# Patient Record
Sex: MALE | Race: BLACK OR AFRICAN AMERICAN
[De-identification: names, ages, dates, MRNs, and addresses within clinical notes are randomized per-mention and may not be internally consistent; named-entity substitution may affect disease eponyms.]

---

## 2023-02-15 ENCOUNTER — HOSPITAL ENCOUNTER (EMERGENCY)
Dept: HOSPITAL 97 - ER | Age: 53
Discharge: HOME | End: 2023-02-15
Payer: COMMERCIAL

## 2023-02-15 VITALS — OXYGEN SATURATION: 97 % | DIASTOLIC BLOOD PRESSURE: 83 MMHG | SYSTOLIC BLOOD PRESSURE: 149 MMHG

## 2023-02-15 VITALS — TEMPERATURE: 98.8 F

## 2023-02-15 DIAGNOSIS — I10: ICD-10-CM

## 2023-02-15 DIAGNOSIS — J44.1: Primary | ICD-10-CM

## 2023-02-15 DIAGNOSIS — F17.210: ICD-10-CM

## 2023-02-15 LAB
ALBUMIN SERPL BCP-MCNC: 4 G/DL (ref 3.4–5)
ALBUMIN SERPL BCP-MCNC: 4.4 G/DL (ref 3.4–5)
ALP SERPL-CCNC: 74 U/L (ref 45–117)
ALP SERPL-CCNC: 79 U/L (ref 45–117)
ALT SERPL W P-5'-P-CCNC: 21 U/L (ref 16–61)
ALT SERPL W P-5'-P-CCNC: 29 U/L (ref 16–61)
AST SERPL W P-5'-P-CCNC: 16 U/L (ref 15–37)
AST SERPL W P-5'-P-CCNC: 32 U/L (ref 15–37)
BUN BLD-MCNC: 11 MG/DL (ref 7–18)
BUN BLD-MCNC: 12 MG/DL (ref 7–18)
GLUCOSE SERPLBLD-MCNC: 116 MG/DL (ref 74–106)
GLUCOSE SERPLBLD-MCNC: 175 MG/DL (ref 74–106)
HCT VFR BLD CALC: 42.8 % (ref 39.6–49)
INR BLD: 1.08
LYMPHOCYTES # SPEC AUTO: 1.5 K/UL (ref 0.7–4.9)
MCV RBC: 90.6 FL (ref 80–100)
NT-PROBNP SERPL-MCNC: 99 PG/ML (ref ?–125)
PMV BLD: 7.6 FL (ref 7.6–11.3)
POTASSIUM SERPL-SCNC: 2.6 MMOL/L (ref 3.5–5.1)
POTASSIUM SERPL-SCNC: 3.3 MMOL/L (ref 3.5–5.1)
RBC # BLD: 4.72 M/UL (ref 4.33–5.43)
TROPONIN I SERPL HS-MCNC: 4 PG/ML (ref ?–58.9)

## 2023-02-15 PROCEDURE — 80053 COMPREHEN METABOLIC PANEL: CPT

## 2023-02-15 PROCEDURE — 71275 CT ANGIOGRAPHY CHEST: CPT

## 2023-02-15 PROCEDURE — 36415 COLL VENOUS BLD VENIPUNCTURE: CPT

## 2023-02-15 PROCEDURE — 80048 BASIC METABOLIC PNL TOTAL CA: CPT

## 2023-02-15 PROCEDURE — 83605 ASSAY OF LACTIC ACID: CPT

## 2023-02-15 PROCEDURE — 84484 ASSAY OF TROPONIN QUANT: CPT

## 2023-02-15 PROCEDURE — 85379 FIBRIN DEGRADATION QUANT: CPT

## 2023-02-15 PROCEDURE — 83880 ASSAY OF NATRIURETIC PEPTIDE: CPT

## 2023-02-15 PROCEDURE — 71046 X-RAY EXAM CHEST 2 VIEWS: CPT

## 2023-02-15 PROCEDURE — 85730 THROMBOPLASTIN TIME PARTIAL: CPT

## 2023-02-15 PROCEDURE — 85025 COMPLETE CBC W/AUTO DIFF WBC: CPT

## 2023-02-15 PROCEDURE — 85610 PROTHROMBIN TIME: CPT

## 2023-02-15 PROCEDURE — 80076 HEPATIC FUNCTION PANEL: CPT

## 2023-02-15 PROCEDURE — 87040 BLOOD CULTURE FOR BACTERIA: CPT

## 2023-02-15 NOTE — RAD REPORT
EXAM DESCRIPTION:  CT - Chest For Pe Angio - 2/15/2023 9:49 am

 

CLINICAL HISTORY:  Congestion. High blood pressure. Shortness of breath.

 

COMPARISON:  Chest radiograph of earlier the same day

 

TECHNIQUE:  Dynamically enhanced axial 3 mm thick images of the chest were obtained during administra
tion of 100 mL Isovue 370 IV contrast. Coronal and oblique reconstruction images were generated and r
eviewed. Exam utilizes a protocol for optimal evaluation of pulmonary arterial tree.

 

All CT scans are performed using dose optimization technique as appropriate and may include automated
 exposure control or mA/KV adjustment according to patient size.

 

FINDINGS:  Pulmonary arteries are normal in caliber. No emboli or other suspicious finding. No acute 
or significant aortic findings.

 

No mass or infiltrate in the lung parenchyma. 3 millimeter left upper lobe nodule, axial image 11/155
, likely benign. No pleural thickening or pleural effusion. No pneumothorax.

 

No abnormal mediastinal or hilar masses or lymphadenopathy seen. No chest wall mass or abnormal axill
iary lymphadenopathy.

 

 

IMPRESSION:  No acute centra pulmonary embolus.

 

No other acute pulmonary process.

## 2023-02-15 NOTE — EDPHYS
Physician Documentation                                                                           

 [facility]                                                                 

Name: Christopher Howard                                                                               

Age: 52 yrs                                                                                       

Sex: Male                                                                                         

: 1970                                                                                   

MRN: [id number]                                                                                   

Arrival Date: 02/15/2023                                                                          

Time: 07:35                                                                                       

Account#: [id number]                                                                            

Bed 15                                                                                            

Private MD:                                                                                       

ED Physician Jared Crystal                                                                       

HPI:                                                                                              

02/15                                                                                             

07:56 This 52 yrs old Black Male presents to ER via Ambulatory with complaints of Breathing   bs3 

      Difficulty, High Blood Pressure.                                                            

07:56 52-year-old history of hypertension, hyperlipidemia, smoking presents with chest        bs3 

      pressure difficulty breathing he has a slight cough he could not sleep last night           

      because of the cough and therefore he came in today he notes fever yesterday symptoms       

      have been going on for several days but got worse yesterday.                                

07:56 He denies any leg swelling the chest discomfort is when he coughs that is not constant  bs3 

      it is not exertional.                                                                       

                                                                                                  

Historical:                                                                                       

- Allergies:                                                                                      

07:59 No Known Allergies;                                                                     ap3 

- Home Meds:                                                                                      

07:59 metoprolol tartrate 100 mg Oral tab 1 tab 2 times per day [Active]; amlodipine 10 mg    ap3 

      tab 1 tab once daily [Active]; hydrochlorothiazide 25 mg Oral tab 1 tab once daily          

      [Active]; atorvastatin 20 mg oral tab 1 tab once daily [Active]; losartan 100 mg oral       

      tab 1 tab once daily [Active];                                                              

- PMHx:                                                                                           

07:59 Hypertensive disorder; Hypercholesterolemia;                                            ap3 

                                                                                                  

- Immunization history:: Client reports receiving the 2nd dose of the Covid vaccine,              

  Flu vaccine is not up to date.                                                                  

- Social history:: Smoking status: Patient reports the use of cigarette tobacco                   

  products, smokes one pack cigarettes per day. Patient uses alcohol, claims drinking             

  about a 6 pack/day.                                                                             

                                                                                                  

                                                                                                  

ROS:                                                                                              

07:56 Constitutional: Positive for fever                                                      bs3 

07:56 All other systems are negative.                                                             

                                                                                                  

Exam:                                                                                             

07:56 Constitutional:  This is a well developed, well nourished patient who is awake, alert,  bs3 

      and in no acute distress. Head/Face:  Normocephalic, atraumatic. Eyes:  Pupils equal        

      round and reactive to light, extra-ocular motions intact.  Lids and lashes normal.          

      ENT:  mmm, no posterior phyarngeal erythema Neck:  Trachea midline, no thyromegaly, no      

      neck stiffness Chest/axilla:  Normal chest wall appearance and motion.  Nontender with      

      no deformity.  No lesions are appreciated. Cardiovascular:  Regular rate and rhythm         

      with a normal S1 and S2.  symmetric pulses in upper extremities Respiratory:                

      Expiratory wheezing, no increased work of breathing no crackles                             

                                                                                                  

Vital Signs:                                                                                      

07:57  / 105; Pulse 139; Resp 19; Temp 98.8; Pulse Ox 96% ; Weight 89.81 kg; Height 5   ap3 

      ft. 8 in. (172.72 cm); Pain 0/10;                                                           

08:31  / 102; Pulse 144; Resp 26; Pulse Ox 100% on Nebulizer Mask;                      db  

09:00  / 95; Pulse 120; Resp 20; Pulse Ox 95% ;                                         db  

09:30  / 94; Pulse 112; Resp 18; Pulse Ox 99% on R/A;                                   db  

10:55  / 82; Pulse 111; Resp 20; Pulse Ox 98% on R/A;                                   db  

11:30  / 89; Pulse 143; Resp 18; Pulse Ox 100% on R/A;                                  db  

12:00  / 81; Pulse 128; Resp 18; Pulse Ox 100% on R/A;                                  db  

13:00  / 83; Pulse 105; Resp 18; Pulse Ox 97% ;                                         db  

07:57 Body Mass Index 30.11 (89.81 kg, 172.72 cm)                                             ap3 

                                                                                                  

MDM:                                                                                              

07:44 Patient medically screened.                                                             bs3 

07:56 Differential diagnosis: Possible ACS but less likely possible heart failure although    bs3 

      less likely patient is a heavy smoker no history of COPD but given the wheezing will        

      try DuoNeb patient may have an acute upper respiratory infection causing reactive           

      airway disease we will do serial exams will rule out pneumonia.                             

10:20 ED course: D-dimer was elevated therefore a CTA was done which was negative patient had bs3 

      improvement of his heart rate after taking his blood pressure medications and the           

      DuoNeb's work-up was nondiagnostic he likely has undiagnosed COPD.                          

10:38 ED course: Patient had significant improvement with the DuoNeb with how he felt         bs3 

      therefore likely undiagnosed COPD counseled at length.                                      

10:50 Data reviewed: vital signs, nurses notes. ED course: Telemetry reviewed normal sinus    bs3 

      108 at 10:50 AM.                                                                            

11:15 ED course: Advised him to follow-up regarding his pulmonary nodule given his smoking    bs3 

      history.                                                                                    

11:24 ED course: EKG performed at 844 interpreted by myself at 848, sinus tachycardia at 146  bs3 

      no ST elevations or depressions QTc 467.                                                    

13:25 ED course: Patient feeling much better on reassessment x-rays read by myself no acute   bs3 

      pulmonary edema heart rate improved discussed with patient who wanted to go home will       

      discharge home return precautions given.                                                    

                                                                                                  

02/15                                                                                             

07:55 Order name: CBC with Diff                                                               bs3 

02/15                                                                                             

07:55 Order name: BMP                                                                         bs3 

02/15                                                                                             

07:55 Order name: Troponin High Sensitivity                                                   bs3 

02/15                                                                                             

07:55 Order name: BNP                                                                         bs3 

02/15                                                                                             

08:25 Order name: D-Dimer                                                                     bs3 

02/15                                                                                             

08:59 Order name: CBC with Automated Diff; Complete Time: 09:10                               EDMS

02/15                                                                                             

09:06 Order name: D-Dimer; Complete Time: 09:10                                               EDMS

02/15                                                                                             

09:11 Order name: Blood Culture Adult (2)                                                     bs3 

02/15                                                                                             

09:11 Order name: CMP                                                                         bs3 

02/15                                                                                             

09:11 Order name: Lactate w/ 2H reflex if indic.                                              bs3 

02/15                                                                                             

09:11 Order name: Protime (+inr)                                                              bs3 

02/15                                                                                             

09:11 Order name: Ptt, Activated                                                              bs3 

02/15                                                                                             

09:20 Order name: Basic Metabolic Panel; Complete Time: 10:19                                 EDMS

02/15                                                                                             

09:20 Order name: Troponin High Sensitivity; Complete Time: 10:19                             EDMS

02/15                                                                                             

07:55 Order name: XRAY Chest Pa And Lat (2 Views)                                             bs3 

02/15                                                                                             

08:41 Order name: RAD; Complete Time: 09:10                                                   EDMS

02/15                                                                                             

09:11 Order name: CT Chest For PE Angio                                                       bs3 

02/15                                                                                             

09:20 Order name: NT PRO-BNP; Complete Time: 10:19                                            EDMS

02/15                                                                                             

10:19 Order name: CT; Complete Time: 10:19                                                    EDMS

02/15                                                                                             

10:20 Order name: Protime (+INR); Complete Time: 11:15                                        EDMS

02/15                                                                                             

10:20 Order name: PTT, Activated Partial Thromb; Complete Time: 11:15                         EDMS

02/15                                                                                             

10:21 Order name: Lactate w/ 2H reflex if indic.; Complete Time: 11:15                        EDMS

02/15                                                                                             

10:34 Order name: LFT's                                                                       bs3 

02/15                                                                                             

11:25 Order name: Liver (Hepatic) Function; Complete Time: 12:18                              EDMS

02/15                                                                                             

13:20 Order name: Comprehensive Metabolic Panel                                               EDMS

02/15                                                                                             

13:39 Order name: Lactate Sepsis 2 HR Follow-up                                               EDMS

02/15                                                                                             

07:55 Order name: EKG - Nurse/Tech; Complete Time: 09:07                                      bs3 

                                                                                                  

Administered Medications:                                                                         

08:25 Drug: DuoNeb (albuterol 2.5 mg, ipratropium 0.5 mg) (3:1) (2.5 mg - 0.5 mg) 3 ml Route: db  

      Nebulizer;                                                                                  

09:18 Follow up: Response: No adverse reaction                                                db  

09:05 Drug: NS 0.9% 500 ml Route: IV; Rate: bolus; Site: right antecubital;                   db  

13:48 Follow up: Response: No adverse reaction; IV Status: Completed infusion; IV Intake:     db  

      500ml                                                                                       

10:33 Drug: Rocephin (cefTRIAXone) 1 grams Route: IV; Rate: bolus; Site: right antecubital;   db  

11:00 Follow up: Response: No adverse reaction; IV Status: Completed infusion; IV Intake: 50mldb  

10:57 Drug: MethylPrednisoLONE 40 mg Route: IVP; Site: right antecubital;                     db  

11:32 Follow up: Response: No adverse reaction                                                db  

10:58 Drug: DuoNeb (albuterol 2.5 mg, ipratropium 0.5 mg) (3:1) (2.5 mg - 0.5 mg) 3 ml Route: db  

      Nebulizer;                                                                                  

11:32 Follow up: Response: No adverse reaction                                                db  

11:31 Drug: DuoNeb (albuterol 2.5 mg, ipratropium 0.5 mg) (3:1) (2.5 mg - 0.5 mg) 3 ml Route: db  

      Nebulizer;                                                                                  

12:32 Follow up: Response: No adverse reaction                                                db  

12:45 Drug: NS 0.9% 500 ml Route: IV; Rate: bolus; Site: right antecubital;                   db  

13:48 Follow up: Response: No adverse reaction; IV Status: Completed infusion; IV Intake:     db  

      500ml                                                                                       

                                                                                                  

                                                                                                  

Disposition Summary:                                                                              

02/15/23 13:26                                                                                    

Discharge Ordered                                                                                 

      Location: Home                                                                          bs3 

      Problem: new                                                                            bs3 

      Symptoms: are resolved                                                                  bs3 

      Condition: Stable                                                                       bs3 

      Diagnosis                                                                                   

        - COPD/ Chronic obstructive pulmonary disease with (acute) exacerbation               bs3 

      Followup:                                                                               bs3 

        - With: Private Physician                                                                  

        - When: 2 - 3 days                                                                         

        - Reason: Re-evaluation by your physician                                                  

      Discharge Instructions:                                                                     

        - Discharge Summary Sheet                                                             bs3 

        - Chronic Bronchitis, Adult                                                           bs3 

        - Form - COPD Action Plan                                                             bs3 

        - COPD and Physical Activity                                                          bs3 

      Forms:                                                                                      

        - Medication Reconciliation Form                                                      bs3 

        - Thank You Letter                                                                    bs3 

        - Antibiotic Education                                                                bs3 

        - Prescription Opioid Use                                                             bs3 

      Prescriptions:                                                                              

        - Ventolin HFA 90 mcg/actuation Inhalation HFA aerosol inhaler                             

            - inhale 2 puff by INHALATION route every 4-6 hours; 1 puff; Refills: 0, Product  bs3 

      Selection Permitted                                                                         

        - azithromycin 500 mg Oral tablet                                                          

            - take 1 tablet by ORAL route once daily for 3 days; 3 tablet; Refills: 0,        bs3 

      Product Selection Permitted                                                                 

        - Prednisone 20 mg Oral Tablet                                                             

            - take 3 tablets by ORAL route once daily for 5 days; 15 tablet; Refills: 0,      bs3 

      Product Selection Permitted                                                                 

Signatures:                                                                                       

Dispatcher [employer]                           Adamaris Frederick RN                    RN   ap3                                                  

Jared Crystal MD MD   bs3                                                  

Siobhan Mijares RN                    RN   db                                                   

                                                                                                  

**************************************************************************************************

## 2023-02-15 NOTE — XMS REPORT
Continuity of Care Document

                          Created on:February 15, 2023



Patient:SHELLEY MACKAY

Sex:Male

:1970

External Reference #:[id number]





Demographics







                          Address                   [address]



                                                    [place] [address]

 

                          Home Phone                [phone]

 

                          Work Phone                [phone]

 

                          Mobile Phone              [phone] )

 

                          Email Address             DECLINED

 

                          Preferred Language        Unknown

 

                          Marital Status            Unknown

 

                          Mormonism Affiliation     Unknown

 

                          Race                      Unknown

 

                          Additional Race(s)        Unavailable



                                                    Black or 

 

                          Ethnic Group              Unknown









Author







                          Organization              [facility]

t

 

                          Address                   [address]



                                                    [place] [address]

 

                          Phone                     [phone]









Care Team Providers







                    Name                Role                Phone

 

                    Robbin Prince     Primary Care Physician [phone]









Problems

This patient has no known problems.



Allergies, Adverse Reactions, Alerts







       Allergy Allergy Status Severity Reaction(s) Onset  Inactive Treating Comm

ents 

Source



       Name   Type                        Date   Date   Clinician        

 

       [facility] - [facility] Active               -                      



       Intraven ty to                       5-18                        



       ous    adverse                      00:00:                      



              reaction                      00                          



              to drug                                                  

 

       Lisinopr Propensi Active               -0                      



       il     ty to                       5-21                        



              adverse                      00:00:                      



              reaction                      00                          



              to drug                                                  







Medications







       Ordered Filled Start  Stop   Current Ordering Indication Dosage Frequency

 Signature

                    Comments            Components          Source



     Medication Medication Date Date Medication? Clinician                (SIG) 

          



     Name Name                                                   

 

     amlodipine      -0      No             1mg                      



     10 mg      5-18                                              



     tablet      00:00:                                              



               00                                                

 

     hydrochloro      2-0      No             1mg                      



     thiazide 25      5-18                                              



     mg tablet      00:00:                                              



               00                                                

 

     losartan      2-0      No             1mg                      



     100 mg      5-18                                              



     tablet      00:00:                                              



               00                                                

 

     metoprolol      2-0      No             1mg                      



     succinate      5-18                                              



      mg      00:00:                                              



     tablet,exte      00                                                



     nded                                                        



     release 24                                                        



     hr                                                          

 

     atorvastati      2-0      No             1mg                      



     n 20 mg      5-18                                              



     tablet      00:00:                                              



               00                                                

 

     atorvastati      2-0      No             1mg                      



     n 20 mg      2-01                                              



     tablet      00:00:                                              



               00                                                

 

     hydrochloro      2022-0      No             1mg                      



     thiazide 25      2-01                                              



     mg tablet      00:00:                                              



               00                                                

 

     amlodipine      2022-0      No             1mg                      



     10 mg      2-01                                              



     tablet      00:00:                                              



               00                                                

 

     losartan      2022-0      No             1mg                      



     100 mg      2-01                                              



     tablet      00:00:                                              



               00                                                

 

     metoprolol      2022-0      No             1mg                      



     succinate      2-01                                              



      mg      00:00:                                              



     tablet,exte      00                                                



     nded                                                        



     release 24                                                        



     hr                                                          

 

     losartan      2022-0      No             1mg                      



     100 mg      1-11                                              



     tablet      00:00:                                              



               00                                                

 

     hydrochloro      2022-0      No             1mg                      



     thiazide 25      1-11                                              



     mg tablet      00:00:                                              



               00                                                

 

     amlodipine      2022-0      No             1mg                      



     10 mg      1-11                                              



     tablet      00:00:                                              



               00                                                

 

     metoprolol      2022-0      No             1mg                      



     succinate      1-11                                              



      mg      00:00:                                              



     tablet,exte      00                                                



     nded                                                        



     release 24                                                        



     hr                                                          

 

     atorvastati      2022-0      No             1mg                      



     n 20 mg      1-11                                              



     tablet      00:00:                                              



               00                                                

 

     metoprolol      2021-1      No             1mg                      



     succinate      2-13                                              



      mg      00:00:                                              



     tablet,exte      00                                                



     nded                                                        



     release 24                                                        



     hr                                                          

 

     atorvastati      1-1      No             1mg                      



     n 20 mg      2-13                                              



     tablet      00:00:                                              



               00                                                

 

     hydrochloro      2021-1      No             1mg                      



     thiazide 25      2-13                                              



     mg tablet      00:00:                                              



               00                                                

 

     amlodipine      2021-1      No             1mg                      



     10 mg      2-13                                              



     tablet      00:00:                                              



               00                                                

 

     losartan      2021-1      No             1mg                      



     100 mg      2-13                                              



     tablet      00:00:                                              



               00                                                

 

     hydrochloro      2021-0      No             1mg                      



     thiazide 25      9-16                                              



     mg tablet      00:00:                                              



               00                                                

 

     amlodipine      2021-0      No             1mg                      



     10 mg      9-16                                              



     tablet      00:00:                                              



               00                                                

 

     amlodipine      2021-0      No             1mg                      



     10 mg      6-16                                              



     tablet      00:00:                                              



               00                                                

 

     hydrochloro      2021-0      No             1mg                      



     thiazide 25      6-16                                              



     mg tablet      00:00:                                              



               00                                                

 

     losartan      2021-0      No             1mg                      



     100 mg      6-16                                              



     tablet      00:00:                                              



               00                                                

 

     metoprolol      2021-0      No             1mg                      



     succinate      6-16                                              



      mg      00:00:                                              



     tablet,exte      00                                                



     nded                                                        



     release 24                                                        



     hr                                                          

 

     atorvastati      2021-0      No             1mg                      



     n 20 mg      6-16                                              



     tablet      00:00:                                              



               00                                                

 

     losartan      2021-0      No             1mg                      



     100 mg      3-17                                              



     tablet      00:00:                                              



               00                                                

 

     hydrochloro      2021-0      No             1mg                      



     thiazide 25      3-17                                              



     mg tablet      00:00:                                              



               00                                                

 

     amlodipine      2021-0      No             1mg                      



     10 mg      3-17                                              



     tablet      00:00:                                              



               00                                                

 

     metoprolol      2021-0      No             1mg                      



     succinate      3-17                                              



      mg      00:00:                                              



     tablet,exte      00                                                



     nded                                                        



     release 24                                                        



     hr                                                          

 

     atorvastati      2021-0      No             1mg                      



     n 20 mg      3-17                                              



     tablet      00:00:                                              



               00                                                

 

     amlodipine      2020-1      No             1mg                      



     10 mg      2-15                                              



     tablet      00:00:                                              



               00                                                

 

     hydrochloro      2020-1      No             1mg                      



     thiazide 25      2-15                                              



     mg tablet      00:00:                                              



               00                                                

 

     losartan      2020-1      No             1mg                      



     100 mg      2-15                                              



     tablet      00:00:                                              



               00                                                

 

     metoprolol      2020-1      No             1mg                      



     succinate      2-15                                              



      mg      00:00:                                              



     tablet,exte      00                                                



     nded                                                        



     release 24                                                        



     hr                                                          

 

     amlodipine      2020-0      No             1mg                      



     10 mg      9-10                                              



     tablet      00:00:                                              



               00                                                

 

     hydrochloro      2020-0      No             1mg                      



     thiazide 25      9-10                                              



     mg tablet      00:00:                                              



               00                                                

 

     losartan      2020-0      No             1mg                      



     100 mg      9-10                                              



     tablet      00:00:                                              



               00                                                

 

     metoprolol      2020-0      No             1mg                      



     succinate      9-10                                              



      mg      00:00:                                              



     tablet,exte      00                                                



     nded                                                        



     release 24                                                        



     hr                                                          

 

     atorvastati      2020-0      No             1mg                      



     n 20 mg      9-10                                              



     tablet      00:00:                                              



               00                                                

 

     losartan      2020-0      No             1mg                      



     100 mg      6-10                                              



     tablet      00:00:                                              



               00                                                

 

     amlodipine      2020-0      No             1mg                      



     10 mg      6-10                                              



     tablet      00:00:                                              



               00                                                

 

     hydrochloro      2020-0      No             1mg                      



     thiazide 25      6-10                                              



     mg tablet      00:00:                                              



               00                                                

 

     losartan      2020-0      No             1mg                      



     100 mg      6-10                                              



     tablet      00:00:                                              



               00                                                

 

     amlodipine      2020-0      No             1mg                      



     10 mg      6-10                                              



     tablet      00:00:                                              



               00                                                

 

     hydrochloro      2020-0      No             1mg                      



     thiazide 25      6-10                                              



     mg tablet      00:00:                                              



               00                                                

 

     losartan      2020-0      No             1mg                      



     100 mg      6-10                                              



     tablet      00:00:                                              



               00                                                

 

     amlodipine      2020-0      No             1mg                      



     10 mg      6-10                                              



     tablet      00:00:                                              



               00                                                

 

     hydrochloro      2020-0      No             1mg                      



     thiazide 25      6-10                                              



     mg tablet      00:00:                                              



               00                                                

 

     metoprolol      2020-0      No             1mg                      



     succinate      6-10                                              



      mg      00:00:                                              



     tablet,exte      00                                                



     nded                                                        



     release 24                                                        



     hr                                                          

 

     metoprolol      2020-0      No             1mg                      



     succinate      6-10                                              



      mg      00:00:                                              



     tablet,exte      00                                                



     nded                                                        



     release 24                                                        



     hr                                                          

 

     metoprolol      2020-0      No             1mg                      



     succinate      6-10                                              



      mg      00:00:                                              



     tablet,exte      00                                                



     nded                                                        



     release 24                                                        



     hr                                                          

 

     atorvastati      2020-0      No             1mg                      



     n 20 mg      6-10                                              



     tablet      00:00:                                              



               00                                                

 

     atorvastati      2020-0      No             1mg                      



     n 20 mg      6-10                                              



     tablet      00:00:                                              



               00                                                

 

     amlodipine      2020-0      No             1mg                      



     10 mg      3-03                                              



     tablet      00:00:                                              



               00                                                

 

     hydrochloro      2020-0      No             1mg                      



     thiazide 25      3-03                                              



     mg tablet      00:00:                                              



               00                                                

 

     losartan      2020-0      No             1mg                      



     100 mg      3-03                                              



     tablet      00:00:                                              



               00                                                

 

     metoprolol      2020-0      No             1mg                      



     tartrate      3-03                                              



     100 mg      00:00:                                              



     tablet      00                                                

 

     atorvastati      2020-0      No             1mg                      



     n 20 mg      3-03                                              



     tablet      00:00:                                              



               00                                                

 

     amlodipine      2019-1      No             1mg                      



     10 mg      2-31                                              



     tablet      00:00:                                              



               00                                                

 

     hydrochloro      2019-1      No             1mg                      



     thiazide 25      2-31                                              



     mg tablet      00:00:                                              



               00                                                

 

     losartan      2019-1      No             1mg                      



     100 mg      2-31                                              



     tablet      00:00:                                              



               00                                                

 

     metoprolol      2019-1      No             1mg                      



     tartrate      2-31                                              



     100 mg      00:00:                                              



     tablet      00                                                

 

     atorvastati      2019-1      No             1mg                      



     n 20 mg      2-31                                              



     tablet      00:00:                                              



               00                                                

 

     hydrochloro      2019-0      No             1mg                      



     thiazide 25      9-24                                              



     mg tablet      00:00:                                              



               00                                                

 

     amlodipine      2019-0      No             1mg                      



     10 mg      9-24                                              



     tablet      00:00:                                              



               00                                                

 

     metoprolol      2019-0      No             1mg                      



     tartrate      9-24                                              



     100 mg      00:00:                                              



     tablet      00                                                

 

     hydrochloro      2019-0      No             1mg                      



     thiazide 25      6-05                                              



     mg tablet      00:00:                                              



               00                                                

 

     amlodipine      2019-0      No             1mg                      



     10 mg      6-05                                              



     tablet      00:00:                                              



               00                                                

 

     metoprolol      2019-0      No             1mg                      



     tartrate      6-05                                              



     100 mg      00:00:                                              



     tablet      00                                                

 

     simvastatin      2019-0      No             1mg                      



     20 mg      6-05                                              



     tablet      00:00:                                              



               00                                                

 

     hydrochloro      2019-0      No             1mg                      



     thiazide 25      5-21                                              



     mg tablet      00:00:                                              



               00                                                

 

     amlodipine      2019-0      No             1mg                      



     10 mg      5-21                                              



     tablet      00:00:                                              



               00                                                

 

     metoprolol      2019-0      No             1mg                      



     tartrate      5-21                                              



     100 mg      00:00:                                              



     tablet      00                                                

 

     lovastatin      2019-0      No             1mg                      



     20 mg      5-21                                              



     tablet      00:00:                                              



               00                                                

 

     lisinopril      2019-0      No             1mg                      



     10 mg      4-05                                              



     tablet      00:00:                                              



               00                                                

 

     hydrochloro      2019-0      No             1mg                      



     thiazide 25      3-10                                              



     mg tablet      00:00:                                              



               00                                                

 

     lovastatin      2019-0      No             1mg                      



     20 mg      2-24                                              



     tablet      00:00:                                              



               00                                                

 

     amlodipine      2019-0      No             1mg                      



     10 mg      2-21                                              



     tablet      00:00:                                              



               00                                                

 

     hydrochloro      2019-0      No             1mg                      



     thiazide 25      2-21                                              



     mg tablet      00:00:                                              



               00                                                

 

     metoprolol      2019-0      No             1mg                      



     tartrate      2-21                                              



     100 mg      00:00:                                              



     tablet      00                                                

 

     hydrochloro      2018-1      No             1mg                      



     thiazide      1-19                                              



     12.5 mg      00:00:                                              



     tablet      00                                                

 

     hydrochloro      2018-1      No             1mg                      



     thiazide      1-01                                              



     12.5 mg      00:00:                                              



     tablet      00                                                

 

     amlodipine      2018-1      No             1mg                      



     10 mg      1-01                                              



     tablet      00:00:                                              



               00                                                

 

     metoprolol      2018-1      No             1mg                      



     tartrate      1-01                                              



     100 mg      00:00:                                              



     tablet      00                                                

 

     amlodipine      2018-1      No             1mg                      



     10 mg      0-17                                              



     tablet      00:00:                                              



               00                                                

 

     metoprolol      2018-1      No             1mg                      



     tartrate      0-17                                              



     100 mg      00:00:                                              



     tablet      00                                                

 

     metoprolol      2018-0      No             1mg                      



     tartrate      9-26                                              



     100 mg      00:00:                                              



     tablet      00                                                

 

     amlodipine      2018-0      No             1mg                      



     10 mg      8-22                                              



     tablet      00:00:                                              



               00                                                

 

     metoprolol      2018-0      No             1mg                      



     tartrate 50      8-22                                              



     mg tablet      00:00:                                              



               00                                                

 

     amlodipine      2018-0      No             1mg                      



     10 mg      4-10                                              



     tablet      00:00:                                              



               00                                                

 

     lisinopril      2018-0      No             1mg                      



     20 mg      4-10                                              



     tablet      00:00:                                              



               00                                                

 

     carvedilol      2018-0      No             1mg                      



     12.5 mg      2-26                                              



     tablet      00:00:                                              



               00                                                

 

     amlodipine      2018-0      No             1mg                      



     10 mg      2-02                                              



     tablet      00:00:                                              



               00                                                

 

     lisinopril      2018-0      No             1mg                      



     20 mg      2-02                                              



     tablet      00:00:                                              



               00                                                

 

     amlodipine      2018-0      No             1mg                      



     10 mg      1-29                                              



     tablet      00:00:                                              



               00                                                

 

     lisinopril      2018-0      No             1mg                      



     20 mg      1-29                                              



     tablet      00:00:                                              



               00                                                

 

     amlodipine      2017-1      No             1mg                      



     10 mg      0-24                                              



     tablet      00:00:                                              



               00                                                

 

     lisinopril      2017-1      No             1mg                      



     20 mg      0-24                                              



     tablet      00:00:                                              



               00                                                

 

     amlodipine      2017-1      No             1mg                      



     10 mg      0-03                                              



     tablet      00:00:                                              



               00                                                







Vital Signs







             Vital Name   Observation Time Observation Value Comments     Source

 

             BP Systolic  2022 08:11:00 106 mm[Hg]                

 

             BP Diastolic 2022 08:11:00 72 mm[Hg]                 

 

             Weight Measured 2022 08:11:00 192.40 pounds              

 

             Height Measured 2022 08:11:00 68.50 inches              

 

             Body Temperature 2022 08:11:00 98.10 degrees              

 

             Heart Rate   2022 08:11:00 68.00 /min                

 

             Respiratory Rate 2022 08:11:00 16.00 /min                

 

             BP Systolic  2022 08:13:00 146 mm[Hg]                

 

             BP Diastolic 2022 08:13:00 89 mm[Hg]                 

 

             Weight Measured 2022 08:13:00 207.00 pounds              

 

             Height Measured 2022 08:13:00 68.50 inches              

 

             Body Temperature 2022 08:13:00 98.60 degrees              

 

             Heart Rate   2022 08:13:00 77.00 /min                

 

             Respiratory Rate 2022 08:13:00 17.00 /min                

 

             BP Systolic  2022 13:49:00 153 mm[Hg]                

 

             BP Diastolic 2022 13:49:00 97 mm[Hg]                 

 

             Weight Measured 2022 13:49:00 208.60 pounds              

 

             Height Measured 2022 13:49:00 68.50 inches              

 

             Body Temperature 2022 13:49:00 97.90 degrees              

 

             Heart Rate   2022 13:49:00 94.00 /min                

 

             Respiratory Rate 2022 13:49:00 16.00 /min                

 

             BP Systolic  2022 15:15:00 116 mm[Hg]                

 

             BP Diastolic 2022 15:15:00 81 mm[Hg]                 

 

             Weight Measured 2022 15:15:00 201.60 pounds              

 

             Height Measured 2022 15:15:00 68.50 inches              

 

             Body Temperature 2022 15:15:00 98.60 degrees              

 

             Heart Rate   2022 15:15:00 91.00 /min                

 

             Respiratory Rate 2022 15:15:00 16.00 /min                

 

             BP Systolic  2021 11:07:00 150 mm[Hg]                

 

             BP Diastolic 2021 11:07:00 83 mm[Hg]                 

 

             Weight Measured 2021 11:07:00 206.60 pounds              

 

             Height Measured 2021 11:07:00 68.50 inches              

 

             Body Temperature 2021 11:07:00 98.90 degrees              

 

             Heart Rate   2021 11:07:00 79.00 /min                

 

             Respiratory Rate 2021 11:07:00 18.00 /min                

 

             BP Systolic  2021 16:31:00 158 mm[Hg]                

 

             BP Diastolic 2021 16:31:00 100 mm[Hg]                

 

             Weight Measured 2021 16:31:00 209.00 pounds              

 

             Height Measured 2021 16:31:00 68.50 inches              

 

             Body Temperature 2021 16:31:00 98.30 degrees              

 

             Heart Rate   2021 16:31:00 82.00 /min                

 

             Respiratory Rate 2021 16:31:00 17.00 /min                

 

             BP Systolic  2021 08:04:00 128 mm[Hg]                

 

             BP Diastolic 2021 08:04:00 87 mm[Hg]                 

 

             Weight Measured 2021 08:04:00 193.60 pounds              

 

             Height Measured 2021 08:04:00 68.50 inches              

 

             Body Temperature 2021 08:04:00 98.90 degrees              

 

             Heart Rate   2021 08:04:00 97.00 /min                

 

             Respiratory Rate 2021 08:04:00 18.00 /min                

 

             BP Systolic  2021 11:09:00 132 mm[Hg]                

 

             BP Diastolic 2021 11:09:00 82 mm[Hg]                 

 

             Weight Measured 2021 11:09:00 206.80 pounds              

 

             Height Measured 2021 11:09:00 68.50 inches              

 

             Body Temperature 2021 11:09:00 98.10 degrees              

 

             Heart Rate   2021 11:09:00 73.00 /min                

 

             Respiratory Rate 2021 11:09:00 16.00 /min                

 

             BP Systolic  2020-12-15 10:14:00 158 mm[Hg]                

 

             BP Diastolic 2020-12-15 10:14:00 89 mm[Hg]                 

 

             Weight Measured 2020-12-15 10:14:00 200.00 pounds              

 

             Height Measured 2020-12-15 10:14:00 68.50 inches              

 

             Body Temperature 2020-12-15 10:14:00 98.20 degrees              

 

             Heart Rate   2020-12-15 10:14:00 107.00 /min               

 

             Respiratory Rate 2020-12-15 10:14:00                           

 

             BP Systolic  2020-09-10 17:39:00 135 mm[Hg]                

 

             BP Diastolic 2020-09-10 17:39:00 83 mm[Hg]                 

 

             Weight Measured 2020-09-10 17:39:00 208.80 pounds              

 

             Height Measured 2020-09-10 17:39:00 68.50 inches              

 

             Body Temperature 2020-09-10 17:39:00 99.10 degrees              

 

             Heart Rate   2020-09-10 17:39:00 80.00 /min                

 

             Respiratory Rate 2020-09-10 17:39:00                           







Procedures

This patient has no known procedures.



Plan of Care







             Planned Activity Planned Date Details      Comments     Source

 

             Goal                      Plan of Care Note [code = 73869-2]       

       

 

             Goal                      Plan of Care Note [code = 92930-7]       

       

 

             Goal                      Plan of Care Note [code = 72044-1]       

       

 

             Goal                      Plan of Care Note [code = 39972-6]       

       

 

             Goal                      Plan of Care Note [code = 66384-2]       

       

 

             Goal                      Plan of Care Note [code = 36119-0]       

       

 

             Goal                      Plan of Care Note [code = 72665-6]       

       

 

             Goal                      Plan of Care Note [code = 73532-6]       

       

 

             Goal                      Plan of Care Note [code = 52837-2]       

       

 

             Goal                      Plan of Care Note [code = 85343-6]       

       

 

             Goal                      Plan of Care Note [code = 15812-8]       

       

 

             Goal                      Plan of Care Note [code = 74589-3]       

       

 

             Goal                      Plan of Care Note [code = 30828-9]       

       

 

             Goal                      Plan of Care Note [code = 10011-7]       

       

 

             Goal                      Plan of Care Note [code = 79633-3]       

       

 

             Goal                      Plan of Care Note [code = 54689-9]       

       







Encounters







        Start   End     Encounter Admission Attending Care    Care    Encounter 

Source



        Date/Time Date/Time Type    Type    Clinicians Facility Department ID   

   

 

        2022 Outpatient                 [facility]     [facility]     [id number]

[address]



        09:33:50 09:33:50                                         [id number]



                                                                        [place]

 

        2022 Outpatient                 [id number] 

[id number] 



        00:00:00 00:00:00 Visit                   [id number]         [id number] 



                                                [id number]         [id number] 



                                                [id number]         [id number]  







Results







           Test Description Test Time  Test Comments Results    Result Comments 

Source









                    HEMOGLOBIN A1c      2022 04:13:18 









                      Test Item  Value      Reference Range Interpretation Comme

nts









             HEMOGLOBIN A1c (test code = 13634) 4.7 %        4.2-5.6            

       



COMPREHENSIVE METABOLIC [id number] 04:04:11





             Test Item    Value        Reference Range Interpretation Comments

 

             GLUCOSE (test code = 97 MG/DL     70-99                     



             )                                               

 

             BUN (test code = 8 MG/DL      6-20                      



             )                                               

 

             CREATININE (test 1.23 MG/DL   0.80-1.40                 



             code = 221)                                        

 

             eGFR ( CKD-EPI) 70 ML/MIN/1.73 >60                       



             (test code = 26083)                                        

 

             CALC BUN/CREAT (test 7 RATIO      6-28                      



             code = 2235)                                        

 

             SODIUM (test code = 133 MEQ/L    133-146                   



             )                                               

 

             POTASSIUM (test code 4.3 MEQ/L    3.5-5.4                   



             = )                                             

 

             CHLORIDE (test code 94 MEQ/L            L            



             = 221)                                             

 

             CARBON DIOXIDE (test 23 MEQ/L     19-31                     



             code = 2206)                                        

 

             CALCIUM (test code = 9.8 MG/DL    8.5-10.5                  



             )                                               

 

             PROTEIN, TOTAL (test 7.6 G/DL     6.1-8.3                   



             code = 2229)                                        

 

             ALBUMIN (test code = 4.7 G/DL     3.5-5.2                   



             )                                               

 

             CALC GLOBULIN (test 2.9 G/DL     1.9-3.7                   



             code = 2240)                                        

 

             CALC A/G RATIO (test 1.6 RATIO    1.0-2.6                   



             code = 2234)                                        

 

             BILIRUBIN, TOTAL 0.4 MG/DL    See_Comment                [Automated

 message]



             (test code = 2207)                                        The syste

m which



                                                                 generated this



                                                                 result transmit

kelly



                                                                 reference range

:



                                                                 <=1.2. The refe

rence



                                                                 range was not u

sed



                                                                 to interpret th

is



                                                                 result as



                                                                 normal/abnormal

.

 

             ALKALINE PHOSPHATASE 60 U/L                           



             (test code = 2204)                                        

 

             AST (test code = 25 U/L       9-50                      



             )                                               

 

             ALT (test code = 28 U/L       2219)                                               



LIPID [id number] 04:04:11





             Test Item    Value        Reference Range Interpretation Comments

 

             CHOLESTEROL (test 192 MG/DL    <200                      



             code = 2210)                                        

 

             TRIGLYCERIDES (test 99 MG/DL     <150                      



             code = 2232)                                        

 

             HDL CHOLESTEROL (test 96 MG/DL     >39                       



             code = 2220)                                        

 

             CALC LDL CHOL (test 78 MG/DL     <100                       NOTE: C

ALCULATED LDL



             code = 2237)                                        IS BASED ON



                                                                 ELDA-TALLEY 

METHOD



                                                                 WHICHINCLUDES



                                                                 ADJUSTABLE



                                                                 TRIGLYCERIDE:VL

DL



                                                                 CHOLESTEROL RAT

IO.THIS



                                                                 FACTOR VARIES B

Y



                                                                 MEASURED TRIGLY

CERIDE



                                                                 AND NON-HDLCHOL

ESTEROL



                                                                 CONCENTRATIONS 

WITH



                                                                 INCREASED CALCU

LATED



                                                                 LDL SEENIN HIGH

ER



                                                                 TRIGLYCERIDE OR

 LOWER



                                                                 NON-HDL SPECIME

NS. FOR



                                                                 MOREINFORMATION

, SEE



                                                                 CLIENT ANNOUNCE

MENT AT



                                                                 [email or web address]



                                                                 /[email or web address]

 

             RISK RATIO LDL/HDL 0.81 RATIO   <3.55                      UNLESS O

THERWISE



             (test code = )                                        INDICATED

, ALL TESTING



                                                                 PERFORMED [facility]



                                                                 PATHOLOGY



                                                                 LABORATORIES, [address]



                                                                 DIRECTOR: ESTRELLA GASCA M.D.

 CLIA



                                                                 NUMBER [id number]

[id number]



                                                                 ACCREDITATION N

O.



                                                                 [id number]



HEMOGLOBIN [id number] 00:00:00





             Test Item    Value        Reference Range Interpretation Comments

 

             HEMOGLOBIN A1c (test code = 19417) 4.7 %                           

       



HEMOGLOBIN [id number] 00:00:00





             Test Item    Value        Reference Range Interpretation Comments

 

             HEMOGLOBIN A1c (test code = 65154) 4.7 %                           

       



HEMOGLOBIN [id number] 00:00:00





             Test Item    Value        Reference Range Interpretation Comments

 

             HEMOGLOBIN A1c (test code = 21306) 4.7 %                           

       



COMPREHENSIVE METABOLIC [id number] 00:00:00





             Test Item    Value        Reference Range Interpretation Comments

 

             GLUCOSE (test code = 2217) 97 MG/DL                               

 

             BUN (test code = 2208) 8 MG/DL                                

 

             CREATININE (test code = 2214) 1.23 MG/DL                           

  

 

             eGFR ( CKD-EPI) (test code 70 ML/MIN/1.73                      

     



             = 97870)                                            

 

             CALC BUN/CREAT (test code = 7 RATIO                                



             2235)                                               

 

             SODIUM (test code = 2231) 133 MEQ/L                              

 

             POTASSIUM (test code = 2228) 4.3 MEQ/L                             

 

 

             CHLORIDE (test code = 2215) 94 MEQ/L                               

 

             CARBON DIOXIDE (test code = 23 MEQ/L                               



             )                                               

 

             CALCIUM (test code = 2209) 9.8 MG/DL                              

 

             PROTEIN, TOTAL (test code = 7.6 G/DL                               



             )                                               

 

             ALBUMIN (test code = 2201) 4.7 G/DL                               

 

             CALC GLOBULIN (test code = 2.9 G/DL                               



             2240)                                               

 

             CALC A/G RATIO (test code = 1.6 RATIO                              



             2234)                                               

 

             BILIRUBIN, TOTAL (test code = 0.4 MG/DL                            

  



             )                                               

 

             ALKALINE PHOSPHATASE (test 60 U/L                                 



             code = 2204)                                        

 

             AST (test code = 2218) 25 U/L                                 

 

             ALT (test code = 2219) 28 U/L                                 



COMPREHENSIVE METABOLIC [id number] 00:00:00





             Test Item    Value        Reference Range Interpretation Comments

 

             GLUCOSE (test code = 2217) 97 MG/DL                               

 

             BUN (test code = 2208) 8 MG/DL                                

 

             CREATININE (test code = 2214) 1.23 MG/DL                           

  

 

             eGFR ( CKD-EPI) (test code 70 ML/MIN/1.73                      

     



             = 77425)                                            

 

             CALC BUN/CREAT (test code = 7 RATIO                                



             2235)                                               

 

             SODIUM (test code = 2231) 133 MEQ/L                              

 

             POTASSIUM (test code = 2228) 4.3 MEQ/L                             

 

 

             CHLORIDE (test code = 2215) 94 MEQ/L                               

 

             CARBON DIOXIDE (test code = 23 MEQ/L                               



             )                                               

 

             CALCIUM (test code = 2209) 9.8 MG/DL                              

 

             PROTEIN, TOTAL (test code = 7.6 G/DL                               



             )                                               

 

             ALBUMIN (test code = 2201) 4.7 G/DL                               

 

             CALC GLOBULIN (test code = 2.9 G/DL                               



             2240)                                               

 

             CALC A/G RATIO (test code = 1.6 RATIO                              



             2234)                                               

 

             BILIRUBIN, TOTAL (test code = 0.4 MG/DL                            

  



             )                                               

 

             ALKALINE PHOSPHATASE (test 60 U/L                                 



             code = 2204)                                        

 

             AST (test code = 2218) 25 U/L                                 

 

             ALT (test code = 2219) 28 U/L                                 



LIPID [id number] 00:00:00





             Test Item    Value        Reference Range Interpretation Comments

 

             CHOLESTEROL (test code = 2210) 192 MG/DL                           

   

 

             TRIGLYCERIDES (test code = 2232) 99 MG/DL                          

     

 

             HDL CHOLESTEROL (test code = 2220) 96 MG/DL                        

       

 

             CALC LDL CHOL (test code = 2237) 78 MG/DL                          

     

 

             RISK RATIO LDL/HDL (test code = 0.81 RATIO                         

    



             2238)                                               



LIPID [id number] 00:00:00





             Test Item    Value        Reference Range Interpretation Comments

 

             CHOLESTEROL (test code = 2210) 192 MG/DL                           

   

 

             TRIGLYCERIDES (test code = 2232) 99 MG/DL                          

     

 

             HDL CHOLESTEROL (test code = 2220) 96 MG/DL                        

       

 

             CALC LDL CHOL (test code = 2237) 78 MG/DL                          

     

 

             RISK RATIO LDL/HDL (test code = 0.81 RATIO                         

    



             2238)                                               



CBC W/AUTO [id number] 00:00:00





             Test Item    Value        Reference Range Interpretation Comments

 

             WBC (test code = 1001) 7.9 K/UL                               

 

             RBC (test code = 1002) 4.04 M/UL                              

 

             HEMOGLOBIN (test code = 1003) 12.3 G/DL                            

  

 

             HEMATOCRIT (test code = 1004) 36.0 %                               

  

 

             MCV (test code = 1005) 89.1 fL                                

 

             MCH (test code = 1006) 30.4 PG                                

 

             MCHC (test code = 1007) 34.2 G/DL                              

 

             RDW (test code = 1038) 11.5 %                                 

 

             NEUTROPHILS (test code = 1008) 56.2 %                              

   

 

             LYMPHOCYTES (test code = 1010) 24.5 %                              

   

 

             MONOCYTES (test code = 1011) 15.0 %                                

 

 

             EOSINOPHILS (test code = 1012) 2.9 %                               

   

 

             BASOPHILS (test code = 1013) 1.4 %                                 

 

 

             PLATELET COUNT (test code = 1015) 360 K/UL                         

      



CBC W/AUTO [id number] 00:00:00





             Test Item    Value        Reference Range Interpretation Comments

 

             WBC (test code = 1001) 7.9 K/UL                               

 

             RBC (test code = 1002) 4.04 M/UL                              

 

             HEMOGLOBIN (test code = 1003) 12.3 G/DL                            

  

 

             HEMATOCRIT (test code = 1004) 36.0 %                               

  

 

             MCV (test code = 1005) 89.1 fL                                

 

             MCH (test code = 1006) 30.4 PG                                

 

             MCHC (test code = 1007) 34.2 G/DL                              

 

             RDW (test code = 1038) 11.5 %                                 

 

             NEUTROPHILS (test code = 1008) 56.2 %                              

   

 

             LYMPHOCYTES (test code = 1010) 24.5 %                              

   

 

             MONOCYTES (test code = 1011) 15.0 %                                

 

 

             EOSINOPHILS (test code = 1012) 2.9 %                               

   

 

             BASOPHILS (test code = 1013) 1.4 %                                 

 

 

             PLATELET COUNT (test code = 1015) 360 K/UL                         

      



CBC W/AUTO [id number] 00:00:00





             Test Item    Value        Reference Range Interpretation Comments

 

             WBC (test code = 1001) 7.9 K/UL                               

 

             RBC (test code = 1002) 4.04 M/UL                              

 

             HEMOGLOBIN (test code = 1003) 12.3 G/DL                            

  

 

             HEMATOCRIT (test code = 1004) 36.0 %                               

  

 

             MCV (test code = 1005) 89.1 fL                                

 

             MCH (test code = 1006) 30.4 PG                                

 

             MCHC (test code = 1007) 34.2 G/DL                              

 

             RDW (test code = 1038) 11.5 %                                 

 

             NEUTROPHILS (test code = 1008) 56.2 %                              

   

 

             LYMPHOCYTES (test code = 1010) 24.5 %                              

   

 

             MONOCYTES (test code = 1011) 15.0 %                                

 

 

             EOSINOPHILS (test code = 1012) 2.9 %                               

   

 

             BASOPHILS (test code = 1013) 1.4 %                                 

 

 

             PLATELET COUNT (test code = 1015) 360 K/UL                         

      



LIPID [id number] 00:00:00





             Test Item    Value        Reference Range Interpretation Comments

 

             CHOLESTEROL (test code = 2210) 212 MG/DL                           

   

 

             TRIGLYCERIDES (test code = 2232) 49 MG/DL                          

     

 

             HDL CHOLESTEROL (test code = 2220) 100 MG/DL                       

       

 

             CALC LDL CHOL (test code = 2237) 98 MG/DL                          

     

 

             RISK RATIO LDL/HDL (test code = 0.98 RATIO                         

    



             2238)                                               



LIPID [id number] 00:00:00





             Test Item    Value        Reference Range Interpretation Comments

 

             CHOLESTEROL (test code = 2210) 212 MG/DL                           

   

 

             TRIGLYCERIDES (test code = 2232) 49 MG/DL                          

     

 

             HDL CHOLESTEROL (test code = 2220) 100 MG/DL                       

       

 

             CALC LDL CHOL (test code = 2237) 98 MG/DL                          

     

 

             RISK RATIO LDL/HDL (test code = 0.98 RATIO                         

    



             2238)                                               



HEMOGLOBIN [id number] 00:00:00





             Test Item    Value        Reference Range Interpretation Comments

 

             HEMOGLOBIN A1c (test code = 05808) 5.0 %                           

       



HEMOGLOBIN [id number] 00:00:00





             Test Item    Value        Reference Range Interpretation Comments

 

             HEMOGLOBIN A1c (test code = 93930) 5.0 %                           

       



HEMOGLOBIN [id number] 00:00:00





             Test Item    Value        Reference Range Interpretation Comments

 

             HEMOGLOBIN A1c (test code = 61315) 5.0 %                           

       



COMPREHENSIVE METABOLIC [id number] 00:00:00





             Test Item    Value        Reference Range Interpretation Comments

 

             GLUCOSE (test code = 2217) 205 MG/DL                              

 

             BUN (test code = 2208) 12 MG/DL                               

 

             CREATININE (test code = 2214) 1.21 MG/DL                           

  

 

             eGFR  AMER. (test code 79 ML/MIN/1.73                       

    



             = 44306)                                            

 

             eGFR NON- AMER. (test 68 ML/MIN/1.73                        

   



             code = 29456)                                        

 

             CALC BUN/CREAT (test code = 10 RATIO                               



             2235)                                               

 

             SODIUM (test code = 2231) 140 MEQ/L                              

 

             POTASSIUM (test code = 2228) 4.1 MEQ/L                             

 

 

             CHLORIDE (test code = 2215) 98 MEQ/L                               

 

             CARBON DIOXIDE (test code = 27 MEQ/L                               



             )                                               

 

             CALCIUM (test code = 2209) 10.9 MG/DL                             

 

             PROTEIN, TOTAL (test code = 8.2 G/DL                               



             )                                               

 

             ALBUMIN (test code = 2201) 4.8 G/DL                               

 

             CALC GLOBULIN (test code = 3.4 G/DL                               



             2240)                                               

 

             CALC A/G RATIO (test code = 1.4 RATIO                              



             2234)                                               

 

             BILIRUBIN, TOTAL (test code = 0.4 MG/DL                            

  



             )                                               

 

             ALKALINE PHOSPHATASE (test 69 U/L                                 



             code = 2204)                                        

 

             AST (test code = 2218) 21 U/L                                 

 

             ALT (test code = 2219) 35 U/L                                 



COMPREHENSIVE METABOLIC [id number] 00:00:00





             Test Item    Value        Reference Range Interpretation Comments

 

             GLUCOSE (test code = 2217) 205 MG/DL                              

 

             BUN (test code = 2208) 12 MG/DL                               

 

             CREATININE (test code = 2214) 1.21 MG/DL                           

  

 

             eGFR  AMER. (test code 79 ML/MIN/1.73                       

    



             = 96093)                                            

 

             eGFR NON- AMER. (test 68 ML/MIN/1.73                        

   



             code = 69049)                                        

 

             CALC BUN/CREAT (test code = 10 RATIO                               



             2235)                                               

 

             SODIUM (test code = 2231) 140 MEQ/L                              

 

             POTASSIUM (test code = 2228) 4.1 MEQ/L                             

 

 

             CHLORIDE (test code = 2215) 98 MEQ/L                               

 

             CARBON DIOXIDE (test code = 27 MEQ/L                               



             )                                               

 

             CALCIUM (test code = 2209) 10.9 MG/DL                             

 

             PROTEIN, TOTAL (test code = 8.2 G/DL                               



             )                                               

 

             ALBUMIN (test code = 2201) 4.8 G/DL                               

 

             CALC GLOBULIN (test code = 3.4 G/DL                               



             2240)                                               

 

             CALC A/G RATIO (test code = 1.4 RATIO                              



             2234)                                               

 

             BILIRUBIN, TOTAL (test code = 0.4 MG/DL                            

  



             )                                               

 

             ALKALINE PHOSPHATASE (test 69 U/L                                 



             code = 2204)                                        

 

             AST (test code = 2218) 21 U/L                                 

 

             ALT (test code = 2219) 35 U/L                                 



SARS-CoV-2 (COVID-19) by RT-PCR (HIGH RISK)2020 00:00:00





             Test Item    Value        Reference Range Interpretation Comments

 

             SARS-CoV-2 INTERPRETATION (test NEGATIVE                           

    



             code = 80926)                                        

 

             SOURCE (test code = 76302) NOT SPECIFIED                           



SARS-CoV-2 (COVID-19) by RT-PCR (HIGH RISK)2020 00:00:00





             Test Item    Value        Reference Range Interpretation Comments

 

             SARS-CoV-2 INTERPRETATION (test NEGATIVE                           

    



             code = 08765)                                        

 

             SOURCE (test code = 31197) NOT SPECIFIED                           



COMPREHENSIVE METABOLIC [id number] 00:00:00





             Test Item    Value        Reference Range Interpretation Comments

 

             GLUCOSE (test code = 2217) 104 MG/DL                              

 

             BUN (test code = 2208) 19 MG/DL                               

 

             CREATININE (test code = 2214) 1.15 MG/DL                           

  

 

             eGFR  AMER. (test code 85 ML/MIN/1.73                       

    



             = 15429)                                            

 

             eGFR NON- AMER. (test 73 ML/MIN/1.73                        

   



             code = 05570)                                        

 

             CALC BUN/CREAT (test code = 17 RATIO                               



             2235)                                               

 

             SODIUM (test code = 2231) 141 MEQ/L                              

 

             POTASSIUM (test code = 2228) 4.6 MEQ/L                             

 

 

             CHLORIDE (test code = 2215) 97 MEQ/L                               

 

             CARBON DIOXIDE (test code = 26 MEQ/L                               



             )                                               

 

             CALCIUM (test code = 2209) 10.6 MG/DL                             

 

             PROTEIN, TOTAL (test code = 8.1 G/DL                               



             )                                               

 

             ALBUMIN (test code = 2201) 5.1 G/DL                               

 

             CALC GLOBULIN (test code = 3.0 G/DL                               



             0)                                               

 

             CALC A/G RATIO (test code = 1.7 RATIO                              



             2234)                                               

 

             BILIRUBIN, TOTAL (test code = 0.5 MG/DL                            

  



             )                                               

 

             ALKALINE PHOSPHATASE (test 80 U/L                                 



             code = 2204)                                        

 

             AST (test code = 2218) 17 U/L                                 

 

             ALT (test code = 2219) 15 U/L                                 



COMPREHENSIVE METABOLIC [id number] 00:00:00





             Test Item    Value        Reference Range Interpretation Comments

 

             GLUCOSE (test code = 2217) 104 MG/DL                              

 

             BUN (test code = 2208) 19 MG/DL                               

 

             CREATININE (test code = 2214) 1.15 MG/DL                           

  

 

             eGFR  AMER. (test code 85 ML/MIN/1.73                       

    



             = 79344)                                            

 

             eGFR NON- AMER. (test 73 ML/MIN/1.73                        

   



             code = 26535)                                        

 

             CALC BUN/CREAT (test code = 17 RATIO                               



             2235)                                               

 

             SODIUM (test code = 2231) 141 MEQ/L                              

 

             POTASSIUM (test code = 2228) 4.6 MEQ/L                             

 

 

             CHLORIDE (test code = 2215) 97 MEQ/L                               

 

             CARBON DIOXIDE (test code = 26 MEQ/L                               



             )                                               

 

             CALCIUM (test code = 220) 10.6 MG/DL                             

 

             PROTEIN, TOTAL (test code = 8.1 G/DL                               



             )                                               

 

             ALBUMIN (test code = 220) 5.1 G/DL                               

 

             CALC GLOBULIN (test code = 3.0 G/DL                               



             )                                               

 

             CALC A/G RATIO (test code = 1.7 RATIO                              



             )                                               

 

             BILIRUBIN, TOTAL (test code = 0.5 MG/DL                            

  



             )                                               

 

             ALKALINE PHOSPHATASE (test 80 U/L                                 



             code = 2204)                                        

 

             AST (test code = 2218) 17 U/L                                 

 

             ALT (test code = 2219) 15 U/L                                 



LIPID [id number] 00:00:00





             Test Item    Value        Reference Range Interpretation Comments

 

             CHOLESTEROL (test code = 2210) 193 MG/DL                           

   

 

             TRIGLYCERIDES (test code = 2232) 128 MG/DL                         

     

 

             HDL CHOLESTEROL (test code = 2220) 54 MG/DL                        

       

 

             CALC LDL CHOL (test code = 2237) 113 MG/DL                         

     

 

             RISK RATIO LDL/HDL (test code = 2.10 RATIO                         

    



             2238)                                               



LIPID [id number] 00:00:00





             Test Item    Value        Reference Range Interpretation Comments

 

             CHOLESTEROL (test code = 2210) 193 MG/DL                           

   

 

             TRIGLYCERIDES (test code = 2232) 128 MG/DL                         

     

 

             HDL CHOLESTEROL (test code = 2220) 54 MG/DL                        

       

 

             CALC LDL CHOL (test code = 2237) 113 MG/DL                         

     

 

             RISK RATIO LDL/HDL (test code = 2.10 RATIO                         

    



             2238)                                               



HEMOGLOBIN [id number] 00:00:00





             Test Item    Value        Reference Range Interpretation Comments

 

             HEMOGLOBIN A1c (test code = 73405) 5.0 %                           

       



HEMOGLOBIN [id number] 00:00:00





             Test Item    Value        Reference Range Interpretation Comments

 

             HEMOGLOBIN A1c (test code = 25975) 5.0 %                           

       



HEMOGLOBIN [id number] 00:00:00





             Test Item    Value        Reference Range Interpretation Comments

 

             HEMOGLOBIN A1c (test code = 33811) 5.0 %                           

       



COMPREHENSIVE METABOLIC [id number] 00:00:00





             Test Item    Value        Reference Range Interpretation Comments

 

             GLUCOSE (test code = 2217) 82 MG/DL                               

 

             BUN (test code = 2208) 14 MG/DL                               

 

             CREATININE (test code = 2214) 0.77 MG/DL                           

  

 

             eGFR  AMER. (test code 123 ML/MIN/1.73                      

     



             = 56417)                                            

 

             eGFR NON- AMER. (test 106 ML/MIN/1.73                       

    



             code = 14037)                                        

 

             CALC BUN/CREAT (test code = 18 RATIO                               



             2235)                                               

 

             SODIUM (test code = 2231) 145 MEQ/L                              

 

             POTASSIUM (test code = 2228) 4.3 MEQ/L                             

 

 

             CHLORIDE (test code = 2215) 107 MEQ/L                              

 

             CARBON DIOXIDE (test code = 24 MEQ/L                               



             2206)                                               

 

             CALCIUM (test code = 2209) 9.8 MG/DL                              

 

             PROTEIN, TOTAL (test code = 7.3 G/DL                               



             )                                               

 

             ALBUMIN (test code = 2201) 4.7 G/DL                               

 

             CALC GLOBULIN (test code = 2.6 G/DL                               



             0)                                               

 

             CALC A/G RATIO (test code = 1.8 RATIO                              



             4)                                               

 

             BILIRUBIN, TOTAL (test code = 0.3 MG/DL                            

  



             )                                               

 

             ALKALINE PHOSPHATASE (test 66 U/L                                 



             code = 2204)                                        

 

             AST (test code = 2218) 21 U/L                                 

 

             ALT (test code = 2219) 24 U/L                                 



COMPREHENSIVE METABOLIC [id number] 00:00:00





             Test Item    Value        Reference Range Interpretation Comments

 

             GLUCOSE (test code = 2217) 82 MG/DL                               

 

             BUN (test code = 2208) 14 MG/DL                               

 

             CREATININE (test code = 2214) 0.77 MG/DL                           

  

 

             eGFR  AMER. (test code 123 ML/MIN/1.73                      

     



             = 78811)                                            

 

             eGFR NON- AMER. (test 106 ML/MIN/1.73                       

    



             code = 12466)                                        

 

             CALC BUN/CREAT (test code = 18 RATIO                               



             2235)                                               

 

             SODIUM (test code = 2231) 145 MEQ/L                              

 

             POTASSIUM (test code = 2228) 4.3 MEQ/L                             

 

 

             CHLORIDE (test code = 2215) 107 MEQ/L                              

 

             CARBON DIOXIDE (test code = 24 MEQ/L                               



             2206)                                               

 

             CALCIUM (test code = 2209) 9.8 MG/DL                              

 

             PROTEIN, TOTAL (test code = 7.3 G/DL                               



             )                                               

 

             ALBUMIN (test code = 2201) 4.7 G/DL                               

 

             CALC GLOBULIN (test code = 2.6 G/DL                               



             224)                                               

 

             CALC A/G RATIO (test code = 1.8 RATIO                              



             2234)                                               

 

             BILIRUBIN, TOTAL (test code = 0.3 MG/DL                            

  



             )                                               

 

             ALKALINE PHOSPHATASE (test 66 U/L                                 



             code = 2204)                                        

 

             AST (test code = 2218) 21 U/L                                 

 

             ALT (test code = 2219) 24 U/L                                 



LIPID [id number] 00:00:00





             Test Item    Value        Reference Range Interpretation Comments

 

             CHOLESTEROL (test code = 2210) 141 MG/DL                           

   

 

             TRIGLYCERIDES (test code = 2232) 200 MG/DL                         

     

 

             HDL CHOLESTEROL (test code = 2220) 45 MG/DL                        

       

 

             CALC LDL CHOL (test code = 2237) 56 MG/DL                          

     

 

             RISK RATIO LDL/HDL (test code = 1.24 RATIO                         

    



             2238)                                               



LIPID [id number] 00:00:00





             Test Item    Value        Reference Range Interpretation Comments

 

             CHOLESTEROL (test code = 2210) 141 MG/DL                           

   

 

             TRIGLYCERIDES (test code = 2232) 200 MG/DL                         

     

 

             HDL CHOLESTEROL (test code = 2220) 45 MG/DL                        

       

 

             CALC LDL CHOL (test code = 2237) 56 MG/DL                          

     

 

             RISK RATIO LDL/HDL (test code = 1.24 RATIO                         

    



             2238)                                               



COMPREHENSIVE METABOLIC [id number] 00:00:00





             Test Item    Value        Reference Range Interpretation Comments

 

             GLUCOSE (test code = 2217) 90 MG/DL                               

 

             BUN (test code = 2208) 12 MG/DL                               

 

             CREATININE (test code = 2214) 0.86 MG/DL                           

  

 

             eGFR  AMER. (test code 117 ML/MIN/1.73                      

     



             = 64770)                                            

 

             eGFR NON- AMER. (test 101 ML/MIN/1.73                       

    



             code = 80011)                                        

 

             CALC BUN/CREAT (test code = 14 RATIO                               



             2235)                                               

 

             SODIUM (test code = 2231) 142 MEQ/L                              

 

             POTASSIUM (test code = 2228) 4.6 MEQ/L                             

 

 

             CHLORIDE (test code = 2215) 102 MEQ/L                              

 

             CARBON DIOXIDE (test code = 26 MEQ/L                               



             )                                               

 

             CALCIUM (test code = 2209) 9.8 MG/DL                              

 

             PROTEIN, TOTAL (test code = 7.3 G/DL                               



             )                                               

 

             ALBUMIN (test code = 2201) 4.5 G/DL                               

 

             CALC GLOBULIN (test code = 2.8 G/DL                               



             2240)                                               

 

             CALC A/G RATIO (test code = 1.6 RATIO                              



             2234)                                               

 

             BILIRUBIN, TOTAL (test code = <0.2 MG/DL                           

  



             )                                               

 

             ALKALINE PHOSPHATASE (test 60 U/L                                 



             code = 2204)                                        

 

             AST (test code = 2218) 20 U/L                                 

 

             ALT (test code = 2219) 19 U/L                                 



COMPREHENSIVE METABOLIC [id number] 00:00:00





             Test Item    Value        Reference Range Interpretation Comments

 

             GLUCOSE (test code = 2217) 90 MG/DL                               

 

             BUN (test code = 2208) 12 MG/DL                               

 

             CREATININE (test code = 2214) 0.86 MG/DL                           

  

 

             eGFR  AMER. (test code 117 ML/MIN/1.73                      

     



             = 22655)                                            

 

             eGFR NON- AMER. (test 101 ML/MIN/1.73                       

    



             code = 43772)                                        

 

             CALC BUN/CREAT (test code = 14 RATIO                               



             2235)                                               

 

             SODIUM (test code = 2231) 142 MEQ/L                              

 

             POTASSIUM (test code = 2228) 4.6 MEQ/L                             

 

 

             CHLORIDE (test code = 2215) 102 MEQ/L                              

 

             CARBON DIOXIDE (test code = 26 MEQ/L                               



             )                                               

 

             CALCIUM (test code = 2209) 9.8 MG/DL                              

 

             PROTEIN, TOTAL (test code = 7.3 G/DL                               



             )                                               

 

             ALBUMIN (test code = 2201) 4.5 G/DL                               

 

             CALC GLOBULIN (test code = 2.8 G/DL                               



             2240)                                               

 

             CALC A/G RATIO (test code = 1.6 RATIO                              



             2234)                                               

 

             BILIRUBIN, TOTAL (test code = <0.2 MG/DL                           

  



             )                                               

 

             ALKALINE PHOSPHATASE (test 60 U/L                                 



             code = 2204)                                        

 

             AST (test code = 2218) 20 U/L                                 

 

             ALT (test code = 2219) 19 U/L                                 



LIPID [id number] 00:00:00





             Test Item    Value        Reference Range Interpretation Comments

 

             CHOLESTEROL (test code = 2210) 182 MG/DL                           

   

 

             TRIGLYCERIDES (test code = 2232) 280 MG/DL                         

     

 

             HDL CHOLESTEROL (test code = 2220) 47 MG/DL                        

       

 

             CALC LDL CHOL (test code = 2237) 79 MG/DL                          

     

 

             RISK RATIO LDL/HDL (test code = 1.68 RATIO                         

    



             2238)                                               



LIPID [id number] 00:00:00





             Test Item    Value        Reference Range Interpretation Comments

 

             CHOLESTEROL (test code = 2210) 182 MG/DL                           

   

 

             TRIGLYCERIDES (test code = 2232) 280 MG/DL                         

     

 

             HDL CHOLESTEROL (test code = 2220) 47 MG/DL                        

       

 

             CALC LDL CHOL (test code = 2237) 79 MG/DL                          

     

 

             RISK RATIO LDL/HDL (test code = 1.68 RATIO                         

    



             2238)                                               



COMPREHENSIVE METABOLIC [id number] 00:00:00





             Test Item    Value        Reference Range Interpretation Comments

 

             GLUCOSE (test code = 2217) 87 MG/DL                               

 

             BUN (test code = 2208) 18 MG/DL                               

 

             CREATININE (test code = 2214) 1.17 MG/DL                           

  

 

             eGFR  AMER. (test code 84 ML/MIN/1.73                       

    



             = 85892)                                            

 

             eGFR NON- AMER. (test 72 ML/MIN/1.73                        

   



             code = 27403)                                        

 

             CALC BUN/CREAT (test code = 15 RATIO                               



             2235)                                               

 

             SODIUM (test code = 2231) 141 MEQ/L                              

 

             POTASSIUM (test code = 2228) 5.3 MEQ/L                             

 

 

             CHLORIDE (test code = 2215) 103 MEQ/L                              

 

             CARBON DIOXIDE (test code = 26 MEQ/L                               



             )                                               

 

             CALCIUM (test code = 2209) 10.0 MG/DL                             

 

             PROTEIN, TOTAL (test code = 8.2 G/DL                               



             )                                               

 

             ALBUMIN (test code = 2201) 5.1 G/DL                               

 

             CALC GLOBULIN (test code = 3.1 G/DL                               



             0)                                               

 

             CALC A/G RATIO (test code = 1.6 RATIO                              



             4)                                               

 

             BILIRUBIN, TOTAL (test code = 0.3 MG/DL                            

  



             )                                               

 

             ALKALINE PHOSPHATASE (test 60 U/L                                 



             code = 2204)                                        

 

             AST (test code = 2218) 18 U/L                                 

 

             ALT (test code = 2219) 18 U/L                                 



COMPREHENSIVE METABOLIC [id number] 00:00:00





             Test Item    Value        Reference Range Interpretation Comments

 

             GLUCOSE (test code = 2217) 87 MG/DL                               

 

             BUN (test code = 2208) 18 MG/DL                               

 

             CREATININE (test code = 2214) 1.17 MG/DL                           

  

 

             eGFR  AMER. (test code 84 ML/MIN/1.73                       

    



             = 18791)                                            

 

             eGFR NON- AMER. (test 72 ML/MIN/1.73                        

   



             code = 18946)                                        

 

             CALC BUN/CREAT (test code = 15 RATIO                               



             2235)                                               

 

             SODIUM (test code = 2231) 141 MEQ/L                              

 

             POTASSIUM (test code = 2228) 5.3 MEQ/L                             

 

 

             CHLORIDE (test code = 2215) 103 MEQ/L                              

 

             CARBON DIOXIDE (test code = 26 MEQ/L                               



             )                                               

 

             CALCIUM (test code = 2209) 10.0 MG/DL                             

 

             PROTEIN, TOTAL (test code = 8.2 G/DL                               



             )                                               

 

             ALBUMIN (test code = 2201) 5.1 G/DL                               

 

             CALC GLOBULIN (test code = 3.1 G/DL                               



             2240)                                               

 

             CALC A/G RATIO (test code = 1.6 RATIO                              



             2234)                                               

 

             BILIRUBIN, TOTAL (test code = 0.3 MG/DL                            

  



             )                                               

 

             ALKALINE PHOSPHATASE (test 60 U/L                                 



             code = 2204)                                        

 

             AST (test code = 2218) 18 U/L                                 

 

             ALT (test code = 2219) 18 U/L                                 



COMPREHENSIVE METABOLIC [id number] 00:00:00





             Test Item    Value        Reference Range Interpretation Comments

 

             GLUCOSE (test code = 2217) 115 MG/DL                              

 

             BUN (test code = 2208) 12 MG/DL                               

 

             CREATININE (test code = 2214) 1.04 MG/DL                           

  

 

             eGFR  AMER. (test code 97 ML/MIN/1.73                       

    



             = 60999)                                            

 

             eGFR NON- AMER. (test 83 ML/MIN/1.73                        

   



             code = 97730)                                        

 

             CALC BUN/CREAT (test code = 12 RATIO                               



             2235)                                               

 

             SODIUM (test code = 2231) 144 MEQ/L                              

 

             POTASSIUM (test code = 2228) 4.2 MEQ/L                             

 

 

             CHLORIDE (test code = 2215) 103 MEQ/L                              

 

             CARBON DIOXIDE (test code = 27 MEQ/L                               



             )                                               

 

             CALCIUM (test code = 2209) 10.3 MG/DL                             

 

             PROTEIN, TOTAL (test code = 7.5 G/DL                               



             )                                               

 

             ALBUMIN (test code = 2201) 4.8 G/DL                               

 

             CALC GLOBULIN (test code = 2.7 G/DL                               



             2240)                                               

 

             CALC A/G RATIO (test code = 1.8 RATIO                              



             2234)                                               

 

             BILIRUBIN, TOTAL (test code = 0.4 MG/DL                            

  



             )                                               

 

             ALKALINE PHOSPHATASE (test 52 U/L                                 



             code = 2204)                                        

 

             AST (test code = 2218) 15 U/L                                 

 

             ALT (test code = 2219) 16 U/L                                 



COMPREHENSIVE METABOLIC [id number] 00:00:00





             Test Item    Value        Reference Range Interpretation Comments

 

             GLUCOSE (test code = 2217) 115 MG/DL                              

 

             BUN (test code = 2208) 12 MG/DL                               

 

             CREATININE (test code = 2214) 1.04 MG/DL                           

  

 

             eGFR  AMER. (test code 97 ML/MIN/1.73                       

    



             = 63216)                                            

 

             eGFR NON- AMER. (test 83 ML/MIN/1.73                        

   



             code = 79079)                                        

 

             CALC BUN/CREAT (test code = 12 RATIO                               



             2235)                                               

 

             SODIUM (test code = 2231) 144 MEQ/L                              

 

             POTASSIUM (test code = 2228) 4.2 MEQ/L                             

 

 

             CHLORIDE (test code = 2215) 103 MEQ/L                              

 

             CARBON DIOXIDE (test code = 27 MEQ/L                               



             )                                               

 

             CALCIUM (test code = 2209) 10.3 MG/DL                             

 

             PROTEIN, TOTAL (test code = 7.5 G/DL                               



             )                                               

 

             ALBUMIN (test code = 2201) 4.8 G/DL                               

 

             CALC GLOBULIN (test code = 2.7 G/DL                               



             )                                               

 

             CALC A/G RATIO (test code = 1.8 RATIO                              



             )                                               

 

             BILIRUBIN, TOTAL (test code = 0.4 MG/DL                            

  



             )                                               

 

             ALKALINE PHOSPHATASE (test 52 U/L                                 



             code = 2204)                                        

 

             AST (test code = 2218) 15 U/L                                 

 

             ALT (test code = 2219) 16 U/L                                 



LIPID [id number] 00:00:00





             Test Item    Value        Reference Range Interpretation Comments

 

             CHOLESTEROL (test code = 2210) 194 MG/DL                           

   

 

             TRIGLYCERIDES (test code = 2232) 102 MG/DL                         

     

 

             HDL CHOLESTEROL (test code = 2220) 76 MG/DL                        

       

 

             CALC LDL CHOL (test code = 2237) 98 MG/DL                          

     

 

             RISK RATIO LDL/HDL (test code = 1.28 RATIO                         

    



             2238)                                               



LIPID [id number] 00:00:00





             Test Item    Value        Reference Range Interpretation Comments

 

             CHOLESTEROL (test code = 2210) 194 MG/DL                           

   

 

             TRIGLYCERIDES (test code = 2232) 102 MG/DL                         

     

 

             HDL CHOLESTEROL (test code = 2220) 76 MG/DL                        

       

 

             CALC LDL CHOL (test code = 2237) 98 MG/DL                          

     

 

             RISK RATIO LDL/HDL (test code = 1.28 RATIO                         

    



             2238)                                               



CBC W/AUTO [id number] 00:00:00





             Test Item    Value        Reference Range Interpretation Comments

 

             WBC (test code = 1001) 8.1 K/UL                               

 

             RBC (test code = 1002) 4.21 M/UL                              

 

             HEMOGLOBIN (test code = 1003) 12.5 G/DL                            

  

 

             HEMATOCRIT (test code = 1004) 37.1 %                               

  

 

             MCV (test code = 1005) 88.1 fL                                

 

             MCH (test code = 1006) 29.7 PG                                

 

             MCHC (test code = 1007) 33.7 G/DL                              

 

             RDW (test code = 1038) 11.4 %                                 

 

             NEUTROPHILS (test code = 1008) 48.8 %                              

   

 

             LYMPHOCYTES (test code = 1010) 31.4 %                              

   

 

             MONOCYTES (test code = 1011) 11.8 %                                

 

 

             EOSINOPHILS (test code = 1012) 6.9 %                               

   

 

             BASOPHILS (test code = 1013) 1.1 %                                 

 

 

             PLATELET COUNT (test code = 1015) 360 K/UL                         

      



CBC W/AUTO [id number] 00:00:00





             Test Item    Value        Reference Range Interpretation Comments

 

             WBC (test code = 1001) 8.1 K/UL                               

 

             RBC (test code = 1002) 4.21 M/UL                              

 

             HEMOGLOBIN (test code = 1003) 12.5 G/DL                            

  

 

             HEMATOCRIT (test code = 1004) 37.1 %                               

  

 

             MCV (test code = 1005) 88.1 fL                                

 

             MCH (test code = 1006) 29.7 PG                                

 

             MCHC (test code = 1007) 33.7 G/DL                              

 

             RDW (test code = 1038) 11.4 %                                 

 

             NEUTROPHILS (test code = 1008) 48.8 %                              

   

 

             LYMPHOCYTES (test code = 1010) 31.4 %                              

   

 

             MONOCYTES (test code = 1011) 11.8 %                                

 

 

             EOSINOPHILS (test code = 1012) 6.9 %                               

   

 

             BASOPHILS (test code = 1013) 1.1 %                                 

 

 

             PLATELET COUNT (test code = 1015) 360 K/UL                         

      



CBC W/AUTO [id number] 00:00:00





             Test Item    Value        Reference Range Interpretation Comments

 

             WBC (test code = 1001) 8.1 K/UL                               

 

             RBC (test code = 1002) 4.21 M/UL                              

 

             HEMOGLOBIN (test code = 1003) 12.5 G/DL                            

  

 

             HEMATOCRIT (test code = 1004) 37.1 %                               

  

 

             MCV (test code = 1005) 88.1 fL                                

 

             MCH (test code = 1006) 29.7 PG                                

 

             MCHC (test code = 1007) 33.7 G/DL                              

 

             RDW (test code = 1038) 11.4 %                                 

 

             NEUTROPHILS (test code = 1008) 48.8 %                              

   

 

             LYMPHOCYTES (test code = 1010) 31.4 %                              

   

 

             MONOCYTES (test code = 1011) 11.8 %                                

 

 

             EOSINOPHILS (test code = 1012) 6.9 %                               

   

 

             BASOPHILS (test code = 1013) 1.1 %                                 

 

 

             PLATELET COUNT (test code = 1015) 360 K/UL                         

      



HEMOGLOBIN [id number] 00:00:00





             Test Item    Value        Reference Range Interpretation Comments

 

             HEMOGLOBIN A1c (test code = 18378) 5.0 %                           

       



HEMOGLOBIN [id number] 00:00:00





             Test Item    Value        Reference Range Interpretation Comments

 

             HEMOGLOBIN A1c (test code = 93004) 5.0 %                           

       



HEMOGLOBIN [id number] 00:00:00





             Test Item    Value        Reference Range Interpretation Comments

 

             HEMOGLOBIN A1c (test code = 00297) 5.0 %                           

       



CBC W/AUTO [id number] 00:00:00





             Test Item    Value        Reference Range Interpretation Comments

 

             WBC (test code = 1001) 7.9 K/UL                               

 

             RBC (test code = 1002) 4.48 M/UL                              

 

             HEMOGLOBIN (test code = 1003) 13.3 G/DL                            

  

 

             HEMATOCRIT (test code = 1004) 40.1 %                               

  

 

             MCV (test code = 1005) 89.5 fL                                

 

             MCH (test code = 1006) 29.7 PG                                

 

             MCHC (test code = 1007) 33.2 G/DL                              

 

             RDW (test code = 1038) 11.1 %                                 

 

             NEUTROPHILS (test code = 1008) 50.6 %                              

   

 

             LYMPHOCYTES (test code = 1010) 32.0 %                              

   

 

             MONOCYTES (test code = 1011) 14.9 %                                

 

 

             EOSINOPHILS (test code = 1012) 2.0 %                               

   

 

             BASOPHILS (test code = 1013) 0.5 %                                 

 

 

             PLATELET COUNT (test code = 1015) 214 K/UL                         

      



CBC W/AUTO [id number] 00:00:00





             Test Item    Value        Reference Range Interpretation Comments

 

             WBC (test code = 1001) 7.9 K/UL                               

 

             RBC (test code = 1002) 4.48 M/UL                              

 

             HEMOGLOBIN (test code = 1003) 13.3 G/DL                            

  

 

             HEMATOCRIT (test code = 1004) 40.1 %                               

  

 

             MCV (test code = 1005) 89.5 fL                                

 

             MCH (test code = 1006) 29.7 PG                                

 

             MCHC (test code = 1007) 33.2 G/DL                              

 

             RDW (test code = 1038) 11.1 %                                 

 

             NEUTROPHILS (test code = 1008) 50.6 %                              

   

 

             LYMPHOCYTES (test code = 1010) 32.0 %                              

   

 

             MONOCYTES (test code = 1011) 14.9 %                                

 

 

             EOSINOPHILS (test code = 1012) 2.0 %                               

   

 

             BASOPHILS (test code = 1013) 0.5 %                                 

 

 

             PLATELET COUNT (test code = 1015) 214 K/UL                         

      



CBC W/AUTO [id number] 00:00:00





             Test Item    Value        Reference Range Interpretation Comments

 

             WBC (test code = 1001) 7.9 K/UL                               

 

             RBC (test code = 1002) 4.48 M/UL                              

 

             HEMOGLOBIN (test code = 1003) 13.3 G/DL                            

  

 

             HEMATOCRIT (test code = 1004) 40.1 %                               

  

 

             MCV (test code = 1005) 89.5 fL                                

 

             MCH (test code = 1006) 29.7 PG                                

 

             MCHC (test code = 1007) 33.2 G/DL                              

 

             RDW (test code = 1038) 11.1 %                                 

 

             NEUTROPHILS (test code = 1008) 50.6 %                              

   

 

             LYMPHOCYTES (test code = 1010) 32.0 %                              

   

 

             MONOCYTES (test code = 1011) 14.9 %                                

 

 

             EOSINOPHILS (test code = 1012) 2.0 %                               

   

 

             BASOPHILS (test code = 1013) 0.5 %                                 

 

 

             PLATELET COUNT (test code = 1015) 214 K/UL                         

      



COMPREHENSIVE METABOLIC [id number] 00:00:00





             Test Item    Value        Reference Range Interpretation Comments

 

             GLUCOSE (test code = 2217) 92 MG/DL                               

 

             BUN (test code = 2208) 17 MG/DL                               

 

             CREATININE (test code = 2214) 0.84 MG/DL                           

  

 

             eGFR  AMER. (test code 119 ML/MIN/1.73                      

     



             = 35338)                                            

 

             eGFR NON- AMER. (test 103 ML/MIN/1.73                       

    



             code = 67635)                                        

 

             CALC BUN/CREAT (test code = 20 RATIO                               



             2235)                                               

 

             SODIUM (test code = 2231) 136 MEQ/L                              

 

             POTASSIUM (test code = 2228) 4.1 MEQ/L                             

 

 

             CHLORIDE (test code = 2215) 99 MEQ/L                               

 

             CARBON DIOXIDE (test code = 22 MEQ/L                               



             )                                               

 

             CALCIUM (test code = 2209) 9.4 MG/DL                              

 

             PROTEIN, TOTAL (test code = 7.1 G/DL                               



             )                                               

 

             ALBUMIN (test code = 2201) 4.6 G/DL                               

 

             CALC GLOBULIN (test code = 2.5 G/DL                               



             2240)                                               

 

             CALC A/G RATIO (test code = 1.8 RATIO                              



             2234)                                               

 

             BILIRUBIN, TOTAL (test code = 0.5 MG/DL                            

  



             )                                               

 

             ALKALINE PHOSPHATASE (test 55 U/L                                 



             code = 2204)                                        

 

             AST (test code = 2218) 29 U/L                                 

 

             ALT (test code = 2219) 37 U/L                                 



COMPREHENSIVE METABOLIC [id number] 00:00:00





             Test Item    Value        Reference Range Interpretation Comments

 

             GLUCOSE (test code = 2217) 92 MG/DL                               

 

             BUN (test code = 2208) 17 MG/DL                               

 

             CREATININE (test code = 2214) 0.84 MG/DL                           

  

 

             eGFR  AMER. (test code 119 ML/MIN/1.73                      

     



             = 00417)                                            

 

             eGFR NON- AMER. (test 103 ML/MIN/1.73                       

    



             code = 54599)                                        

 

             CALC BUN/CREAT (test code = 20 RATIO                               



             2235)                                               

 

             SODIUM (test code = 2231) 136 MEQ/L                              

 

             POTASSIUM (test code = 2228) 4.1 MEQ/L                             

 

 

             CHLORIDE (test code = 2215) 99 MEQ/L                               

 

             CARBON DIOXIDE (test code = 22 MEQ/L                               



             )                                               

 

             CALCIUM (test code = 2209) 9.4 MG/DL                              

 

             PROTEIN, TOTAL (test code = 7.1 G/DL                               



             )                                               

 

             ALBUMIN (test code = 2201) 4.6 G/DL                               

 

             CALC GLOBULIN (test code = 2.5 G/DL                               



             224)                                               

 

             CALC A/G RATIO (test code = 1.8 RATIO                              



             2234)                                               

 

             BILIRUBIN, TOTAL (test code = 0.5 MG/DL                            

  



             )                                               

 

             ALKALINE PHOSPHATASE (test 55 U/L                                 



             code = 2204)                                        

 

             AST (test code = 2218) 29 U/L                                 

 

             ALT (test code = 2219) 37 U/L                                 



LIPID [id number] 00:00:00





             Test Item    Value        Reference Range Interpretation Comments

 

             CHOLESTEROL (test code = 2210) 170 MG/DL                           

   

 

             TRIGLYCERIDES (test code = 2232) 97 MG/DL                          

     

 

             HDL CHOLESTEROL (test code = 2220) 59 MG/DL                        

       

 

             CALC LDL CHOL (test code = 2237) 92 MG/DL                          

     

 

             RISK RATIO LDL/HDL (test code = 1.55 RATIO                         

    



             2238)                                               



LIPID [id number] 00:00:00





             Test Item    Value        Reference Range Interpretation Comments

 

             CHOLESTEROL (test code = 2210) 170 MG/DL                           

   

 

             TRIGLYCERIDES (test code = 2232) 97 MG/DL                          

     

 

             HDL CHOLESTEROL (test code = 2220) 59 MG/DL                        

       

 

             CALC LDL CHOL (test code = 2237) 92 MG/DL                          

     

 

             RISK RATIO LDL/HDL (test code = 1.55 RATIO                         

    



             2238)                                               



THYROID II PROFILE (T3U, T4, T7, TSH)2017 00:00:00





             Test Item    Value        Reference Range Interpretation Comments

 

             T3 UPTAKE (test code = 2817) 34.8 %                                

 

 

             T4 (THYROXINE) (test code = 5.8 UG/DL                              



             2819)                                               

 

             CALCULATED T7 (FTI) (test code = 2.02                              

     



             0)                                               

 

             TSH (test code = 2821) 0.834 UIU/ML                           



THYROID II PROFILE (T3U, T4, T7, TSH)2017 00:00:00





             Test Item    Value        Reference Range Interpretation Comments

 

             T3 UPTAKE (test code = 2817) 34.8 %                                

 

 

             T4 (THYROXINE) (test code = 5.8 UG/DL                              



             281)                                               

 

             CALCULATED T7 (FTI) (test code = 2.02                              

     



             2820)                                               

 

             TSH (test code = 2821) 0.834 UIU/ML

## 2023-02-15 NOTE — ER
Nurse's Notes                                                                                     

 [facility]                                                                 

Name: Christopher Howard                                                                               

Age: 52 yrs                                                                                       

Sex: Male                                                                                         

: 1970                                                                                   

MRN: [id number]                                                                                   

Arrival Date: 02/15/2023                                                                          

Time: 07:35                                                                                       

Account#: [id number]                                                                            

Bed 15                                                                                            

Private MD:                                                                                       

Diagnosis: COPD/ Chronic obstructive pulmonary disease with (acute) exacerbation                  

                                                                                                  

Presentation:                                                                                     

02/15                                                                                             

07:57 Chief complaint: Patient states: his blood pressure is high today, and hasn't taken his ap3 

      blood pressure medications this morning. patient reports difficulty breathing and           

      having feelings of shortness of breath. Coronavirus screen: At this time, the client        

      does not indicate any symptoms associated with coronavirus-19. Ebola Screen: No             

      symptoms or risks identified at this time. Initial Sepsis Screen: Does the patient meet     

      any 2 criteria? HR > 90 bpm. Does the patient have a suspected source of infection? No.     

      Patient's initial sepsis screen is negative. Risk Assessment: Do you want to hurt           

      yourself or someone else? Patient reports no desire to harm self or others. Onset of        

      symptoms was 2023.                                                             

07:57 Method Of Arrival: Ambulatory                                                           ap3 

07:57 Acuity: SUZI 3                                                                           ap3 

                                                                                                  

Triage Assessment:                                                                                

08:02 General: Appears in no apparent distress. Behavior is calm, cooperative, appropriate    ap3 

      for age. Pain: Complains of pain in chest Pain began gradually, 1 day ago. Neuro: Level     

      of Consciousness is awake, alert, obeys commands, Oriented to person, place, time,          

      situation. Cardiovascular: Patient's skin is warm and dry. Respiratory: Reports             

      shortness of breath cough that is Onset: The symptoms/episode began/occurred gradually,     

      the patient has mild shortness of breath.                                                   

                                                                                                  

Historical:                                                                                       

- Allergies:                                                                                      

07:59 No Known Allergies;                                                                     ap3 

- Home Meds:                                                                                      

07:59 metoprolol tartrate 100 mg Oral tab 1 tab 2 times per day [Active]; amlodipine 10 mg    ap3 

      tab 1 tab once daily [Active]; hydrochlorothiazide 25 mg Oral tab 1 tab once daily          

      [Active]; atorvastatin 20 mg oral tab 1 tab once daily [Active]; losartan 100 mg oral       

      tab 1 tab once daily [Active];                                                              

- PMHx:                                                                                           

07:59 Hypertensive disorder; Hypercholesterolemia;                                            ap3 

                                                                                                  

- Immunization history:: Client reports receiving the 2nd dose of the Covid vaccine,              

  Flu vaccine is not up to date.                                                                  

- Social history:: Smoking status: Patient reports the use of cigarette tobacco                   

  products, smokes one pack cigarettes per day. Patient uses alcohol, claims drinking             

  about a 6 pack/day.                                                                             

                                                                                                  

                                                                                                  

Screenin:02 [facility] ED Fall Risk Assessment (Adult) History of falling in the last 3 months,       ap3 

      including since admission No falls in past 3 months (0 pts). Abuse screen: Denies           

      threats or abuse. Nutritional screening: No deficits noted. Tuberculosis screening: No      

      symptoms or risk factors identified.                                                        

                                                                                                  

Assessment:                                                                                       

08:12 Reassessment: patient to xray.                                                          db  

08:30 Reassessment: Patient and/or family updated on plan of care and expected duration. Pain db  

      level reassessed. patient reports SOB that started yesterday and worse today. Denies        

      pain. General: Appears distressed, uncomfortable, Behavior is calm, cooperative. Pain:      

      Denies pain. Neuro: Level of Consciousness is awake, alert, obeys commands, Oriented to     

      person, place, time, situation. Cardiovascular: Reports diaphoresis, shortness of           

      breath, Rhythm is sinus tachycardia. Respiratory: Airway is patent Respiratory effort       

      is even, labored, Respiratory pattern is regular, symmetrical, Breath sounds are coarse     

      bilaterally.                                                                                

09:42 Reassessment: Patient and/or family updated on plan of care and expected duration. Pain db  

      level reassessed. patient to CT Patient states feeling better. Patient states symptoms      

      have improved.                                                                              

10:35 Reassessment: Patient appears in no apparent distress at this time. Patient is alert,   db  

      oriented x 3, equal unlabored respirations, skin warm/dry/pink. Patient states feeling      

      better. Critical care time stopped, patient has stabilized.                                 

11:07 Reassessment: Patient and/or family updated on plan of care and expected duration. Pain db  

      level reassessed. Patient is alert, oriented x 3, equal unlabored respirations, skin        

      warm/dry/pink. patient ambulatory to restroom.                                              

11:32 Reassessment: Patient appears in no apparent distress at this time. Patient and/or      db  

      family updated on plan of care and expected duration. Pain level reassessed. Patient is     

      alert, oriented x 3, equal unlabored respirations, skin warm/dry/pink. breathing            

      treatment in progress.                                                                      

13:23 Reassessment: Patient appears in no apparent distress at this time. Patient and/or      db  

      family updated on plan of care and expected duration. Pain level reassessed. Patient is     

      alert, oriented x 3, equal unlabored respirations, skin warm/dry/pink. patient resting      

      states feels better.                                                                        

                                                                                                  

Vital Signs:                                                                                      

07:57  / 105; Pulse 139; Resp 19; Temp 98.8; Pulse Ox 96% ; Weight 89.81 kg; Height 5   ap3 

      ft. 8 in. (172.72 cm); Pain 0/10;                                                           

08:31  / 102; Pulse 144; Resp 26; Pulse Ox 100% on Nebulizer Mask;                      db  

09:00  / 95; Pulse 120; Resp 20; Pulse Ox 95% ;                                         db  

09:30  / 94; Pulse 112; Resp 18; Pulse Ox 99% on R/A;                                   db  

10:55  / 82; Pulse 111; Resp 20; Pulse Ox 98% on R/A;                                   db  

11:30  / 89; Pulse 143; Resp 18; Pulse Ox 100% on R/A;                                  db  

12:00  / 81; Pulse 128; Resp 18; Pulse Ox 100% on R/A;                                  db  

13:00  / 83; Pulse 105; Resp 18; Pulse Ox 97% ;                                         db  

07:57 Body Mass Index 30.11 (89.81 kg, 172.72 cm)                                             ap3 

                                                                                                  

Vitals:                                                                                           

08:31 Cardiac Rhythm Assessment Regular Sinus tach.                                           db  

                                                                                                  

ED Course:                                                                                        

07:35 Patient arrived in ED.                                                                  rg4 

07:54 Jared Crystal MD is Attending Physician.                                              bs3 

07:59 Triage completed.                                                                       ap3 

08:02 Arm band placed on right wrist.                                                         ap3 

08:09 Siobhan Mijares, RN is Primary Nurse.                                                  db  

08:35 Inserted saline lock: 20 gauge in right antecubital area, using aseptic technique.      db  

      Blood collected.                                                                            

08:57 Patient has correct armband on for positive identification. Bed in low position. Call   db  

      light in reach. Side rails up X 1. Client placed on continuous cardiac and pulse            

      oximetry monitoring. NIBP monitoring applied.                                               

13:45 No provider procedures requiring assistance completed. IV discontinued, intact,         db  

      bleeding controlled, No redness/swelling at site.                                           

                                                                                                  

Administered Medications:                                                                         

08:25 Drug: DuoNeb (albuterol 2.5 mg, ipratropium 0.5 mg) (3:1) (2.5 mg - 0.5 mg) 3 ml Route: db  

      Nebulizer;                                                                                  

09:18 Follow up: Response: No adverse reaction                                                db  

09:05 Drug: NS 0.9% 500 ml Route: IV; Rate: bolus; Site: right antecubital;                   db  

13:48 Follow up: Response: No adverse reaction; IV Status: Completed infusion; IV Intake:     db  

      500ml                                                                                       

10:33 Drug: Rocephin (cefTRIAXone) 1 grams Route: IV; Rate: bolus; Site: right antecubital;   db  

11:00 Follow up: Response: No adverse reaction; IV Status: Completed infusion; IV Intake: 50mldb  

10:57 Drug: MethylPrednisoLONE 40 mg Route: IVP; Site: right antecubital;                     db  

11:32 Follow up: Response: No adverse reaction                                                db  

10:58 Drug: DuoNeb (albuterol 2.5 mg, ipratropium 0.5 mg) (3:1) (2.5 mg - 0.5 mg) 3 ml Route: db  

      Nebulizer;                                                                                  

11:32 Follow up: Response: No adverse reaction                                                db  

11:31 Drug: DuoNeb (albuterol 2.5 mg, ipratropium 0.5 mg) (3:1) (2.5 mg - 0.5 mg) 3 ml Route: db  

      Nebulizer;                                                                                  

12:32 Follow up: Response: No adverse reaction                                                db  

12:45 Drug: NS 0.9% 500 ml Route: IV; Rate: bolus; Site: right antecubital;                   db  

13:48 Follow up: Response: No adverse reaction; IV Status: Completed infusion; IV Intake:     db  

      500ml                                                                                       

                                                                                                  

                                                                                                  

Medication:                                                                                       

08:57 VIS not applicable for this client.                                                     db  

                                                                                                  

Intake:                                                                                           

11:00 IV: 50ml; Total: 50ml.                                                                  db  

13:48 IV: 500ml; Total: 550ml.                                                                db  

13:48 IV: 500ml; Total: 1050ml.                                                               db  

                                                                                                  

Outcome:                                                                                          

13:26 Discharge ordered by MD.                                                                bs3 

13:45 Discharged to home ambulatory.                                                          db  

13:45 Condition: stable                                                                           

13:45 Discharge instructions given to patient, Instructed on discharge instructions, follow       

      up and referral plans. Demonstrated understanding of instructions, Prescriptions given      

      X 3.                                                                                        

13:49 Patient left the ED.                                                                    db  

                                                                                                  

Signatures:                                                                                       

Kenna Sunshine                                 rg4                                                  

Adamaris Joe RN                    RN   ap3                                                  

Jared Crystal MD MD   bs3                                                  

Siobhan Mijares RN                    RN   db                                                   

                                                                                                  

Corrections: (The following items were deleted from the chart)                                    

13:23 12:00  / 50; Pulse 75bpm; Resp 18bpm; Pulse Ox 100% RA; db                        db  

13:23 11:30  / 63; Pulse 86bpm; Resp 18bpm; Pulse Ox 95% RA; db                         db  

13:24 13:12 Reassessment: patient ambulatory. Assisted patient in wheelchair to restroom.     db  

      Patient missed hat for urine. Patient assisted back to room and notified Dr. Crystal. db      

                                                                                                  

**************************************************************************************************

## 2023-02-15 NOTE — RAD REPORT
EXAM DESCRIPTION:  [facility] (2 Views)2/15/2023 8:19 am

 

CLINICAL HISTORY:  COPD

 

COMPARISON:  Chest Single View dated 9/22/2017

 

TECHNIQUE:   PA and lateral views of the chest.

 

FINDINGS:  The lungs are clear. Trace right pleural effusion versus minimal thickening along the kaden
r and major fissures. No pneumothorax. The cardiomediastinal contours are unremarkable.

 

IMPRESSION:  Suggestion of trace effusion versus pleural thickening. No other acute cardiopulmonary p
rocess.